# Patient Record
Sex: FEMALE | Race: BLACK OR AFRICAN AMERICAN | Employment: UNEMPLOYED | URBAN - METROPOLITAN AREA
[De-identification: names, ages, dates, MRNs, and addresses within clinical notes are randomized per-mention and may not be internally consistent; named-entity substitution may affect disease eponyms.]

---

## 2017-01-30 ENCOUNTER — OFFICE VISIT (OUTPATIENT)
Dept: BEHAVIORAL/MENTAL HEALTH CLINIC | Age: 62
End: 2017-01-30

## 2017-01-30 DIAGNOSIS — F33.2 MAJOR DEPRESSIVE DISORDER, RECURRENT, SEVERE WITHOUT PSYCHOTIC FEATURES (HCC): Primary | ICD-10-CM

## 2017-01-30 DIAGNOSIS — F41.9 ANXIETY DISORDER, UNSPECIFIED TYPE: ICD-10-CM

## 2017-01-30 RX ORDER — TRAZODONE HYDROCHLORIDE 100 MG/1
TABLET ORAL
Qty: 30 TAB | Refills: 3 | Status: SHIPPED | OUTPATIENT
Start: 2017-01-30 | End: 2017-05-01 | Stop reason: SDUPTHER

## 2017-01-30 RX ORDER — BUPROPION HYDROCHLORIDE 150 MG/1
TABLET, EXTENDED RELEASE ORAL
Qty: 60 TAB | Refills: 3 | Status: SHIPPED | OUTPATIENT
Start: 2017-01-30 | End: 2017-05-01 | Stop reason: SDUPTHER

## 2017-01-30 RX ORDER — BUSPIRONE HYDROCHLORIDE 15 MG/1
15 TABLET ORAL 3 TIMES DAILY
Qty: 90 TAB | Refills: 3 | Status: SHIPPED | OUTPATIENT
Start: 2017-01-30 | End: 2017-03-01

## 2017-01-30 RX ORDER — FLUOXETINE HYDROCHLORIDE 40 MG/1
CAPSULE ORAL
Qty: 60 CAP | Refills: 3 | Status: SHIPPED | OUTPATIENT
Start: 2017-01-30 | End: 2017-05-01 | Stop reason: SDUPTHER

## 2017-01-30 NOTE — PROGRESS NOTES
Psychiatric Progress Note    Date: 1/30/2017  Account Number:  [id number]  Name: Myron Villalobos    Length of psychotherapy session: 15 minutes     Total Patient Care Time Spent: 20 minutes : (Coordinated care:  counseling time with patient, individual psychotherapy with patient; discussions with family members and chart review). SUBJECTIVE:   Myron Villalobos  is a 63 y.o.  female  patient presents for a therapy/psychopharmacological management appointment. Pt was in wheel chair accompanied by her son. 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 Pt states that she underwent surgery to remove her hip, now she is with \"no hip\". No word on yet when she will get a new hip. Provided support to pt. Pt reports overall doing well psychiatrically but does report lingering anxiety. Recommended to adjust her Buspar dose to help with her anxiety. Pt agreed. Patient denies SI/HI/SIB. No evidence of AH/VH or delusions.       Appetite:no change from normal   Sleep: good    Response to Treatment: Positive   Side Effects: none      Supportive/Cognitive/Reality-Oriented psychotherapy provided in regards to psychosocial stressors:   pre-admission and current problems   Housing issues   Occupational issues   Academic issues   Legal issues   Medical issues   Interpersonal conflicts   Stress of hospitalization  Psychoeducation provided  Treatment plan reviewed with patient-including diagnosis and medications  Worked on issues of denial & effects of substance dependency/use      OBJECTIVE:                 Mental Status exam: WNL except for      Sensorium  oriented to time, place and person   Relations cooperative    Eye Contact    appropriate   Appearance:  age appropriate, casually dressed and overweight, in wheel chair    Motor Behavior:  within normal limits/in wheel chair    Speech:  normal pitch and normal volume   Thought Process: within normal limits Thought Content free of delusions and free of hallucinations   Suicidal ideations none   Homicidal ideations none   Mood:  Anxious    Affect:  mood congruent    Memory recent  adequate   Memory remote:  adequate   Concentration:  adequate   Abstraction:  abstract   Insight:  fair   Reliability fair   Judgment:  fair       Allergies   Allergen Reactions    Penicillin G Benzathin,Procain Hives        Current Outpatient Prescriptions   Medication Sig Dispense Refill    traZODone (DESYREL) 100 mg tablet TAKE 1 TABLET BY MOUTH AT BEDTIME FOR INSOMNIA  Indications: insomnia associated with depression, insomnia 30 Tab 3    FLUoxetine (PROZAC) 40 mg capsule TAKE 2 CAPSULA BY MOUTH DAILY  Indications: ANXIETY WITH DEPRESSION, major depressive disorder 60 Cap 3    buPROPion SR (WELLBUTRIN SR) 150 mg SR tablet TAKE 1 TABLET BY MOUTH EVERY MORNING AND TAKE SECOND DOSE AT 2PM  Indications: major depressive disorder 60 Tab 3    busPIRone (BUSPAR) 15 mg tablet Take 1 Tab by mouth three (3) times daily for 30 days. Indications: GENERALIZED ANXIETY DISORDER 90 Tab 3    oxyCODONE IR (ROXICODONE) 5 mg immediate release tablet Take 1 Tab by mouth every six (6) hours as needed. Max Daily Amount: 20 mg. 20 Tab 0    gabapentin (NEURONTIN) 300 mg capsule Take 300 mg by mouth three (3) times daily.  meloxicam (MOBIC) 15 mg tablet Take 15 mg by mouth daily. Indications: OSTEOARTHRITIS      topiramate (TOPAMAX) 100 mg tablet Take 1 Tab by mouth nightly. 30 Tab 3    omeprazole (PRILOSEC) 20 mg capsule Take 20 mg by mouth daily.  MULTIVITAMINS WITH FLUORIDE (MULTI-VITAMIN PO) Take 1 Tab by mouth daily.  CALCIUM PO Take 500 mg by mouth daily.  CYANOCOBALAMIN, VITAMIN B-12, (VITAMIN B-12 PO) Take  by mouth daily.  budesonide-formoterol (SYMBICORT) 160-4.5 mcg/actuation HFA inhaler Take 2 Puffs by inhalation two (2) times a day.  metoprolol succinate (TOPROL-XL) 50 mg XL tablet Take 1 Tab by mouth daily. (Patient taking differently: Take 50 mg by mouth nightly.) 30 Tab 5    ipratropium-albuterol (COMBIVENT RESPIMAT)  mcg/actuation inhaler Take 1 puff by inhalation as needed for Wheezing.  montelukast (SINGULAIR) 10 mg tablet Take 10 mg by mouth nightly. Medication Changes/Adjustments:   Medications Discontinued During This Encounter   Medication Reason    busPIRone (BUSPAR) 10 mg tablet Dose Adjustment    traZODone (DESYREL) 100 mg tablet Reorder    FLUoxetine (PROZAC) 40 mg capsule Reorder    buPROPion SR (WELLBUTRIN SR) 150 mg SR tablet Reorder          ASSESSMENT:  Eunice Healy  is a 63 y.o.  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 Currently reports feeling better, but is without \"hip\". She has been doing well on her psych meds, her mood has been stable, she does report lingering anxiety. Diagnoses:   Axis I: Major Depressive d/o, recurrent, moderate   Anxiety d/o   Axis II: Deferred   Axis III: Asthma, Sleep Apnea   Axis IV: Moderate  Axis V:  55-65    RECOMMENDATIONS/PLAN: continue with current meds  1. Medications: Medications reviewed, continue with the current meds. Orders Placed This Encounter    traZODone (DESYREL) 100 mg tablet    FLUoxetine (PROZAC) 40 mg capsule    buPROPion SR (WELLBUTRIN SR) 150 mg SR tablet    busPIRone (BUSPAR) 15 mg tablet      2. Follow-up Disposition:  Return in about 3 months (around 4/30/2017).

## 2017-01-30 NOTE — MR AVS SNAPSHOT
Visit Information Date & Time Provider Department Dept. Phone Encounter #  
 1/30/2017  9:30 AM MD Lily Soto [address] [phone] [id number] Follow-up Instructions Return in about 3 months (around 4/30/2017). Your Appointments 5/1/2017 11:15 AM  
ESTABLISHED PATIENT with MD Lily Soto [facility]) Appt Note:   
 [address] [facility] [address] [place] [address]  
[phone] [address] [place] [address] Upcoming Health Maintenance Date Due Hepatitis C Screening 1955 Pneumococcal 19-64 Medium Risk (1 of 1 - PPSV23) 7/31/1976 DTaP/Tdap/Td series (1 - Tdap) 7/31/1978 PAP AKA CERVICAL CYTOLOGY 7/31/1978 BREAST CANCER SCRN MAMMOGRAM 7/31/2007 FOBT Q 1 YEAR AGE 50-75 7/31/2007 INFLUENZA AGE 9 TO ADULT 8/1/2016 Allergies as of 1/30/2017  Review Complete On: 1/30/2017 By: Thomas Lakhani MD  
  
 Severity Noted Reaction Type Reactions Penicillin G Benzathin,procain  01/24/2013    Hives Current Immunizations  Never Reviewed No immunizations on file. Not reviewed this visit You Were Diagnosed With   
  
 Codes Comments Major depressive disorder, recurrent, severe without psychotic features ([facility] [address]    -  Primary ICD-10-CM: F33.2 ICD-9-CM: 296.33 Anxiety disorder, unspecified type     ICD-10-CM: F41.9 ICD-9-CM: 300.00 Vitals OB Status Smoking Status Hysterectomy Never Smoker Preferred Pharmacy Pharmacy Name Phone [facility] [address] [facility]  [facility] [phone] Your Updated Medication List  
  
   
This list is accurate as of: 1/30/17 10:06 AM.  Always use your most recent med list.  
  
  
  
  
 budesonide-formoterol 160-4.5 mcg/actuation HFA inhaler Commonly known as:  SYMBICORT  
 Take 2 Puffs by inhalation two (2) times a day. buPROPion  mg SR tablet Commonly known as:  WELLBUTRIN SR  
TAKE 1 TABLET BY MOUTH EVERY MORNING AND TAKE SECOND DOSE AT 2PM  Indications: major depressive disorder  
  
 busPIRone 15 mg tablet Commonly known as:  BUSPAR Take 1 Tab by mouth three (3) times daily for 30 days. Indications: GENERALIZED ANXIETY DISORDER  
  
 CALCIUM PO Take 500 mg by mouth daily. COMBIVENT RESPIMAT  mcg/actuation inhaler Generic drug:  ipratropium-albuterol Take 1 puff by inhalation as needed for Wheezing. FLUoxetine 40 mg capsule Commonly known as:  PROzac TAKE 2 CAPSULA BY MOUTH DAILY  Indications: ANXIETY WITH DEPRESSION, major depressive disorder  
  
 gabapentin 300 mg capsule Commonly known as:  NEURONTIN Take 300 mg by mouth three (3) times daily. meloxicam 15 mg tablet Commonly known as:  MOBIC Take 15 mg by mouth daily. Indications: OSTEOARTHRITIS  
  
 metoprolol succinate 50 mg XL tablet Commonly known as:  TOPROL-XL Take 1 Tab by mouth daily. montelukast 10 mg tablet Commonly known as:  SINGULAIR Take 10 mg by mouth nightly. MULTI-VITAMIN PO Take 1 Tab by mouth daily. omeprazole 20 mg capsule Commonly known as:  PRILOSEC Take 20 mg by mouth daily. oxyCODONE IR 5 mg immediate release tablet Commonly known as:  Charolet Music Take 1 Tab by mouth every six (6) hours as needed. Max Daily Amount: 20 mg.  
  
 topiramate 100 mg tablet Commonly known as:  TOPAMAX Take 1 Tab by mouth nightly. traZODone 100 mg tablet Commonly known as:  DESYREL  
TAKE 1 TABLET BY MOUTH AT BEDTIME FOR INSOMNIA  Indications: insomnia associated with depression, insomnia VITAMIN B-12 PO Take  by mouth daily. Prescriptions Printed Refills  
 traZODone (DESYREL) 100 mg tablet 3  Sig: TAKE 1 TABLET BY MOUTH AT BEDTIME FOR INSOMNIA  Indications: insomnia associated with depression, insomnia Class: Print FLUoxetine (PROZAC) 40 mg capsule 3 Sig: TAKE 2 CAPSULA BY MOUTH DAILY  Indications: ANXIETY WITH DEPRESSION, major depressive disorder Class: Print buPROPion SR (WELLBUTRIN SR) 150 mg SR tablet 3 Sig: TAKE 1 TABLET BY MOUTH EVERY MORNING AND TAKE SECOND DOSE AT 2PM  Indications: major depressive disorder Class: Print  
 busPIRone (BUSPAR) 15 mg tablet 3 Sig: Take 1 Tab by mouth three (3) times daily for 30 days. Indications: GENERALIZED ANXIETY DISORDER Class: Print Route: Oral  
  
Follow-up Instructions Return in about 3 months (around 4/30/2017). Introducing [facility]! Dear Mary Andrew: 
Thank you for requesting a [employer] account. Our records indicate that you already have an active [employer] account. You can access your account anytime at [email or web address] Did you know that you can access your hospital and ER discharge instructions at any time in [employer]? You can also review all of your test results from your hospital stay or ER visit. Additional Information If you have questions, please visit the Frequently Asked Questions section of the [employer] website at [email or web address] Remember, [employer] is NOT to be used for urgent needs. For medical emergencies, dial 911. Now available from your iPhone and Android! Please provide this summary of care documentation to your next provider. Your primary care clinician is listed as Heri Jackson. If you have any questions after today's visit, please call [phone].

## 2017-03-02 RX ORDER — TOPIRAMATE 100 MG/1
100 TABLET, FILM COATED ORAL
Qty: 30 TAB | Refills: 0 | Status: SHIPPED | OUTPATIENT
Start: 2017-03-02 | End: 2017-09-20

## 2017-04-14 ENCOUNTER — OFFICE VISIT (OUTPATIENT)
Dept: CARDIOLOGY CLINIC | Age: 62
End: 2017-04-14

## 2017-04-14 VITALS
WEIGHT: 178 LBS | OXYGEN SATURATION: 95 % | HEIGHT: 62 IN | RESPIRATION RATE: 19 BRPM | BODY MASS INDEX: 32.76 KG/M2 | HEART RATE: 68 BPM | DIASTOLIC BLOOD PRESSURE: 93 MMHG | SYSTOLIC BLOOD PRESSURE: 138 MMHG

## 2017-04-14 DIAGNOSIS — R06.02 SHORTNESS OF BREATH: Primary | ICD-10-CM

## 2017-04-14 DIAGNOSIS — R94.39 ABNORMAL STRESS TEST: ICD-10-CM

## 2017-04-14 NOTE — PROGRESS NOTES
LAST OFFICE VISIT : 8/20/2015      No diagnosis found. Jean Carlos Rodríguez is a 63 y.o. female with hypertension and hyperlipidemia referred for cardiac clearance. Cardiac risk factors: dyslipidemia, obesity, hypertension, post-menopausal.  I have personally obtained the history from the patient. HISTORY OF PRESENTING ILLNESS      She is doing well from a cardiac standpoint currently. She is having hip surgery on 4/17 and requires cardiac clearance for this. The patient denies chest pain/ shortness of breath, orthopnea, PND, LE edema, palpitations, syncope, presyncope or fatigue.        ACTIVE PROBLEM LIST     Patient Active Problem List    Diagnosis Date Noted    Abscess of right hip 11/08/2016    COPD (chronic obstructive pulmonary disease) ([facility] [address] 01/19/2016    HAWK (obstructive sleep apnea) 01/19/2016    Morbid obesity ([address] 05/19/2015    Insomnia 03/11/2015    Essential tremor 09/30/2014    Anxiety disorder 09/09/2013    Major depressive disorder, recurrent episode, severe ([address] 09/09/2013           PAST MEDICAL HISTORY     Past Medical History:   Diagnosis Date    Anxiety     Arthritis     osteoarthritis    Asthma     Chronic pain     bilateral knees, lower back, neck    COPD (chronic obstructive pulmonary disease) ([address]     Depression     Diabetes (HCC)     diet controlled    Fatigue     FH: mental illness     GERD (gastroesophageal reflux disease)     Hypertension     Ill-defined condition     essential tremor    Morbid obesity (HCC)     Muscle pain     Ringing in ears     Shingles 2/2016    Sleep apnea     Sleep apnea     no CPAP  2 liters 02 via nc hs    Snoring     SOB (shortness of breath)            PAST SURGICAL HISTORY     Past Surgical History:   Procedure Laterality Date    HX GASTRECTOMY  7/13/16    sleeve by Dr. Tiff Logan  7/13/16    Hiatal by     HX HYSTERECTOMY  1990    HX ORTHOPAEDIC  2009    R total hip repleacement    HX ORTHOPAEDIC  5/2015    repair of left knee          ALLERGIES     Allergies   Allergen Reactions    Penicillin G Benzathin,Procain Hives          FAMILY HISTORY     Family History   Problem Relation Age of Onset    HIV/AIDS Mother     Asthma Father     Asthma Brother     Diabetes Brother     Asthma Brother     Cancer Paternal Grandmother     Anesth Problems Neg Hx     negative for cardiac disease       SOCIAL HISTORY     Social History     Social History    Marital status:      Spouse name: N/A    Number of children: 3    Years of education: N/A     Occupational History    unemployed       Social History Main Topics    Smoking status: Never Smoker    Smokeless tobacco: Never Used    Alcohol use No    Drug use: No    Sexual activity: Not Asked     Other Topics Concern    None     Social History Narrative    In the home with spouse and adult son (both independent)         MEDICATIONS     Current Outpatient Prescriptions   Medication Sig    topiramate (TOPAMAX) 100 mg tablet Take 1 Tab by mouth nightly.  traZODone (DESYREL) 100 mg tablet TAKE 1 TABLET BY MOUTH AT BEDTIME FOR INSOMNIA  Indications: insomnia associated with depression, insomnia    FLUoxetine (PROZAC) 40 mg capsule TAKE 2 CAPSULA BY MOUTH DAILY  Indications: ANXIETY WITH DEPRESSION, major depressive disorder    buPROPion SR (WELLBUTRIN SR) 150 mg SR tablet TAKE 1 TABLET BY MOUTH EVERY MORNING AND TAKE SECOND DOSE AT 2PM  Indications: major depressive disorder    gabapentin (NEURONTIN) 300 mg capsule Take 300 mg by mouth three (3) times daily.  omeprazole (PRILOSEC) 20 mg capsule Take 20 mg by mouth daily.  MULTIVITAMINS WITH FLUORIDE (MULTI-VITAMIN PO) Take 1 Tab by mouth daily.  CALCIUM PO Take 500 mg by mouth daily.  CYANOCOBALAMIN, VITAMIN B-12, (VITAMIN B-12 PO) Take  by mouth daily.  budesonide-formoterol (SYMBICORT) 160-4.5 mcg/actuation HFA inhaler Take 2 Puffs by inhalation two (2) times a day.     metoprolol succinate (TOPROL-XL) 50 mg XL tablet Take 1 Tab by mouth daily. (Patient taking differently: Take 50 mg by mouth nightly.)    ipratropium-albuterol (COMBIVENT RESPIMAT)  mcg/actuation inhaler Take 1 puff by inhalation as needed for Wheezing.  montelukast (SINGULAIR) 10 mg tablet Take 10 mg by mouth nightly.  oxyCODONE IR (ROXICODONE) 5 mg immediate release tablet Take 1 Tab by mouth every six (6) hours as needed. Max Daily Amount: 20 mg.    meloxicam (MOBIC) 15 mg tablet Take 15 mg by mouth daily. Indications: OSTEOARTHRITIS     No current facility-administered medications for this visit. I have reviewed the nurses notes, vitals, problem list, allergy list, medical history, family, social history and medications. REVIEW OF SYMPTOMS      General: Pt denies excessive weight gain or loss. Pt is able to conduct ADL's  HEENT: Denies blurred vision, headaches, hearing loss, epistaxis and difficulty swallowing. Respiratory: Denies cough, congestion, shortness of breath, GRANADOS, wheezing or stridor. Cardiovascular: Denies precordial pain, palpitations, edema or PND  Gastrointestinal: Denies poor appetite, indigestion, abdominal pain or blood in stool  Genitourinary: Denies hematuria, dysuria, increased urinary frequency  Musculoskeletal: Denies joint pain or swelling from muscles or joints  Neurologic: Denies tremor, paresthesias, headache, or sensory motor disturbance  Psychiatric: Denies confusion, insomnia, depression  Integumentray: Denies rash, itching or ulcers. Hematologic: Denies easy bruising, bleeding     PHYSICAL EXAMINATION      Vitals:    04/14/17 1056   BP: (!) 138/93   Pulse: 68   Resp: 19   SpO2: 95%   Weight: 178 lb (80.7 kg)   Height: 5' 2\" (1.575 m)     General: Well developed, in no acute distress. In wheelchair  HEENT: No jaundice, oral mucosa moist, no oral ulcers  Neck: Supple, no stiffness, no lymphadenopathy, supple  Heart:  Normal S1/S2 negative S3 or S4. Regular, no murmur, gallop or rub, no jugular venous distention  Respiratory: Clear bilaterally x 4, no wheezing or rales    Extremities:  No edema, normal cap refill, no cyanosis. Musculoskeletal: No clubbing, no deformities   Neuro: A&Ox3, speech clear, gait stable, cooperative, no focal neurologic deficits  Skin: Skin color is normal. No rashes or lesions. Non diaphoretic, moist.  Vascular: 2+ pulses symmetric in all extremities        EKG: Pending     DIAGNOSTIC DATA     1. Cardiac Cath  9/23/15-   1) LM long,large no dz  2) LAD moderate caliber course to the apex. There is some myocardial  bridging in the mid segment of the LAD. No disease. Diagonal without disease. DI high take off and no disease  3) Circumflex -mod -> small no disease  4) RCA is large dominant with no disease and the PDA and PL are free of  Disease    2. Echo  7/1/15- EF 65%    3. Lexiscan  8/21/15- mod size, mod-severe grade, predominantly reversible defect involving the apex and distal inferior wall, mod extent of ischemia       LABORATORY DATA            Lab Results   Component Value Date/Time    WBC 5.3 11/09/2016 02:32 AM    HGB 11.9 11/09/2016 02:32 AM    HCT 36.6 11/09/2016 02:32 AM    PLATELET 377 85/29/6608 02:32 AM    MCV 97.3 11/09/2016 02:32 AM      Lab Results   Component Value Date/Time    Sodium 144 11/11/2016 01:13 AM    Potassium 3.6 11/11/2016 01:13 AM    Chloride 113 11/11/2016 01:13 AM    CO2 24 11/11/2016 01:13 AM    Anion gap 7 11/11/2016 01:13 AM    Glucose 103 11/11/2016 01:13 AM    BUN 17 11/11/2016 01:13 AM    Creatinine 0.73 11/11/2016 01:13 AM    BUN/Creatinine ratio 23 11/11/2016 01:13 AM    GFR est AA >60 11/11/2016 01:13 AM    GFR est non-AA >60 11/11/2016 01:13 AM    Calcium 8.2 11/11/2016 01:13 AM    Bilirubin, total 0.3 11/08/2016 07:28 PM    AST (SGOT) 18 11/08/2016 07:28 PM    Alk.  phosphatase 96 11/08/2016 07:28 PM    Protein, total 7.0 11/08/2016 07:28 PM    Albumin 3.0 11/08/2016 07:28 PM    Globulin 4.0 11/08/2016 07:28 PM    A-G Ratio 0.8 11/08/2016 07:28 PM    ALT (SGPT) 20 11/08/2016 07:28 PM           ASSESSMENT/RECOMMENDATIONS:.      1. Cardiac preoperative risk assessment  -I believe she is low operative risk based on normal cardiac catheterization in 2015   -no chest pain or SOB  -she may proceed with surgery  -if any further assistance is needed do not hesitate to call   -awaiting ECG from her primary cares office.     2. Hypertension  -BP today was 138/80  -did have some coffee this morning and it may be related to that   -if BP does not come down will increase metoprolol to 75 mg daily  -make sure metoprolol is not held at any time during surgery      3. dyslipidemia  -lipids have been at goal and are followed by her PCP     4. Return in 6 months or PRN    No orders of the defined types were placed in this encounter. Follow-up Disposition: Not on File      I have discussed the diagnosis with  Geofm Males and the intended plan as seen in the above orders. Questions were answered concerning future plans. I have discussed medication side effects and warnings with the patient as well. Thank you,  Jolene Stevenson MD for involving me in the care of  Geofm Males. Please do not hesitate to contact me for further questions/concerns. This note was written by estefany Esparza, as dictated by Miguel Valverde MD.      Charli Zimmer. MD [facility], [address] [place] [address] Rd[address]      [facility], [address] [place] [address] [address]      [phone] / [phone] Fax

## 2017-04-14 NOTE — PROGRESS NOTES
Visit Vitals    BP (!) 138/93 (BP 1 Location: Left arm, BP Patient Position: Sitting)    Pulse 68    Resp 19    Ht 5' 2\" (1.575 m)    Wt 178 lb (80.7 kg)    SpO2 95%    BMI 32.56 kg/m2     Pt has no question or concerns at this time   Here or clearance for surgery

## 2017-05-01 ENCOUNTER — OFFICE VISIT (OUTPATIENT)
Dept: BEHAVIORAL/MENTAL HEALTH CLINIC | Age: 62
End: 2017-05-01

## 2017-05-01 VITALS — DIASTOLIC BLOOD PRESSURE: 87 MMHG | SYSTOLIC BLOOD PRESSURE: 132 MMHG | HEART RATE: 82 BPM | HEIGHT: 62 IN

## 2017-05-01 DIAGNOSIS — F33.1 MAJOR DEPRESSIVE DISORDER, RECURRENT, MODERATE (HCC): ICD-10-CM

## 2017-05-01 DIAGNOSIS — F41.9 ANXIETY DISORDER, UNSPECIFIED TYPE: Primary | ICD-10-CM

## 2017-05-01 RX ORDER — BUSPIRONE HYDROCHLORIDE 15 MG/1
15 TABLET ORAL 3 TIMES DAILY
Qty: 90 TAB | Refills: 3 | Status: SHIPPED | OUTPATIENT
Start: 2017-05-01 | End: 2017-08-01 | Stop reason: DRUGHIGH

## 2017-05-01 RX ORDER — TRAZODONE HYDROCHLORIDE 100 MG/1
TABLET ORAL
Qty: 30 TAB | Refills: 3 | Status: SHIPPED | OUTPATIENT
Start: 2017-05-01 | End: 2017-08-01 | Stop reason: SDUPTHER

## 2017-05-01 RX ORDER — BUPROPION HYDROCHLORIDE 150 MG/1
TABLET, EXTENDED RELEASE ORAL
Qty: 30 TAB | Refills: 3 | Status: SHIPPED | OUTPATIENT
Start: 2017-05-01 | End: 2017-08-01 | Stop reason: DRUGHIGH

## 2017-05-01 RX ORDER — FLUOXETINE HYDROCHLORIDE 40 MG/1
CAPSULE ORAL
Qty: 30 CAP | Refills: 3 | Status: SHIPPED | OUTPATIENT
Start: 2017-05-01 | End: 2017-08-01 | Stop reason: DRUGHIGH

## 2017-05-01 RX ORDER — BUSPIRONE HYDROCHLORIDE 15 MG/1
TABLET ORAL
COMMUNITY
Start: 2017-04-28 | End: 2017-05-01 | Stop reason: SDUPTHER

## 2017-05-01 NOTE — MR AVS SNAPSHOT
Visit Information Date & Time Provider Department Dept. Phone Encounter #  
 5/1/2017 11:15 AM Leander Leventhal, MD [phone] [id number] Follow-up Instructions Return in about 3 months (around 8/1/2017). Upcoming Health Maintenance Date Due Hepatitis C Screening 1955 Pneumococcal 19-64 Medium Risk (1 of 1 - PPSV23) 7/31/1976 DTaP/Tdap/Td series (1 - Tdap) 7/31/1978 PAP AKA CERVICAL CYTOLOGY 7/31/1978 BREAST CANCER SCRN MAMMOGRAM 7/31/2007 FOBT Q 1 YEAR AGE 50-75 7/31/2007 INFLUENZA AGE 9 TO ADULT 8/1/2017 Allergies as of 5/1/2017  Review Complete On: 5/1/2017 By: Leander Leventhal, MD  
  
 Severity Noted Reaction Type Reactions Penicillin G Benzathin,procain  01/24/2013    Hives Current Immunizations  Never Reviewed No immunizations on file. Not reviewed this visit You Were Diagnosed With   
  
 Codes Comments Anxiety disorder, unspecified type    -  Primary ICD-10-CM: F41.9 ICD-9-CM: 300.00 Major depressive disorder, recurrent, moderate (HCC)     ICD-10-CM: F33.1 ICD-9-CM: 296.32 Vitals BP Pulse Height(growth percentile) OB Status Smoking Status 132/87 82 5' 2\" (1.575 m) Hysterectomy Never Smoker Preferred Pharmacy Pharmacy Name Phone [address] [address], [address] AT [facility] OF  [facility] [phone] Your Updated Medication List  
  
   
This list is accurate as of: 5/1/17 11:49 AM.  Always use your most recent med list.  
  
  
  
  
 budesonide-formoterol 160-4.5 mcg/actuation HFA inhaler Commonly known as:  SYMBICORT Take 2 Puffs by inhalation two (2) times a day. buPROPion  mg SR tablet Commonly known as:  WELLBUTRIN SR  
TAKE 1 TABLET BY MOUTH EVERY MORNING  Indications: ANXIETY WITH DEPRESSION, major depressive disorder  
  
 busPIRone 15 mg tablet Commonly known as:  BUSPAR  
 Take 1 Tab by mouth three (3) times daily. Indications: GENERALIZED ANXIETY DISORDER  
  
 CALCIUM PO Take 500 mg by mouth daily. COMBIVENT RESPIMAT  mcg/actuation inhaler Generic drug:  ipratropium-albuterol Take 1 puff by inhalation as needed for Wheezing. FLUoxetine 40 mg capsule Commonly known as:  PROzac TAKE 1 CAPSULE BY MOUTH DAILY  Indications: ANXIETY WITH DEPRESSION, major depressive disorder  
  
 gabapentin 300 mg capsule Commonly known as:  NEURONTIN Take 300 mg by mouth three (3) times daily. meloxicam 15 mg tablet Commonly known as:  MOBIC Take 15 mg by mouth daily. Indications: OSTEOARTHRITIS  
  
 metoprolol succinate 50 mg XL tablet Commonly known as:  TOPROL-XL Take 1 Tab by mouth daily. montelukast 10 mg tablet Commonly known as:  SINGULAIR Take 10 mg by mouth nightly. MULTI-VITAMIN PO Take 1 Tab by mouth daily. omeprazole 20 mg capsule Commonly known as:  PRILOSEC Take 20 mg by mouth daily. oxyCODONE IR 5 mg immediate release tablet Commonly known as:  Thressa Iftikhar Take 1 Tab by mouth every six (6) hours as needed. Max Daily Amount: 20 mg.  
  
 topiramate 100 mg tablet Commonly known as:  TOPAMAX Take 1 Tab by mouth nightly. traZODone 100 mg tablet Commonly known as:  DESYREL  
TAKE 1 TABLET BY MOUTH AT BEDTIME FOR INSOMNIA  Indications: insomnia associated with depression, insomnia VITAMIN B-12 PO Take  by mouth daily. Prescriptions Printed Refills buPROPion SR (WELLBUTRIN SR) 150 mg SR tablet 3 Sig: TAKE 1 TABLET BY MOUTH EVERY MORNING  Indications: ANXIETY WITH DEPRESSION, major depressive disorder Class: Print  
 busPIRone (BUSPAR) 15 mg tablet 3 Sig: Take 1 Tab by mouth three (3) times daily. Indications: GENERALIZED ANXIETY DISORDER Class: Print  Route: Oral  
 traZODone (DESYREL) 100 mg tablet 3  
 Sig: TAKE 1 TABLET BY MOUTH AT BEDTIME FOR INSOMNIA  Indications: insomnia associated with depression, insomnia Class: Print FLUoxetine (PROZAC) 40 mg capsule 3 Sig: TAKE 1 CAPSULE BY MOUTH DAILY  Indications: ANXIETY WITH DEPRESSION, major depressive disorder Class: Print Follow-up Instructions Return in about 3 months (around 8/1/2017). Introducing hospitals & [facility] SERVICES! Dear Zachary Carlton: 
Thank you for requesting a [employer] account. Our records indicate that you already have an active [employer] account. You can access your account anytime at [email or web address] Did you know that you can access your hospital and ER discharge instructions at any time in [employer]? You can also review all of your test results from your hospital stay or ER visit. Additional Information If you have questions, please visit the Frequently Asked Questions section of the [employer] website at [email or web address] Remember, [employer] is NOT to be used for urgent needs. For medical emergencies, dial 911. Now available from your iPhone and Android! Please provide this summary of care documentation to your next provider. Your primary care clinician is listed as Mackenzie Barragan. If you have any questions after today's visit, please call [phone].

## 2017-05-03 NOTE — PROGRESS NOTES
Psychiatric Progress Note    Date: 5/1/2017  Account Number:  [id number]  Name: aSndra Gallardo    Length of psychotherapy session: 15 minutes     Total Patient Care Time Spent: 20 minutes : (Coordinated care:  counseling time with patient, individual psychotherapy with patient; discussions with family members and chart review). SUBJECTIVE:   Sandra Gallardo  is a 63 y.o.  female  patient presents for a therapy/psychopharmacological management appointment. Pt was in wheel chair accompanied by her son. She recently underwent another hip surgery to replace the hip which was removed in her last surgery, pt is hopeful and expects full recovery. She does feel the need to cut back on her psych meds because she thinks its too much. Currently her mood is stable on the meds. Discussed the need to taper down her meds slowly to see if she continues to stay stable, if mood fluctuates then pt may need to go back on her previous doses, pt verbalized understanding. Patient denies SI/HI/SIB. No evidence of AH/VH or delusions.       Appetite:no change from normal   Sleep: good    Response to Treatment: Positive   Side Effects: none      Supportive/Cognitive/Reality-Oriented psychotherapy provided in regards to psychosocial stressors:   pre-admission and current problems   Housing issues   Occupational issues   Academic issues   Legal issues   Medical issues   Interpersonal conflicts   Stress of hospitalization  Psychoeducation provided  Treatment plan reviewed with patient-including diagnosis and medications  Worked on issues of denial & effects of substance dependency/use      OBJECTIVE:                 Mental Status exam: WNL except for      Sensorium  oriented to time, place and person   Relations cooperative    Eye Contact    appropriate   Appearance:  age appropriate, casually dressed and overweight, in wheel chair    Motor Behavior:  within normal limits/in wheel chair    Speech:  normal pitch and normal volume   Thought Process: within normal limits   Thought Content free of delusions and free of hallucinations   Suicidal ideations none   Homicidal ideations none   Mood:  stable   Affect:  mood congruent/constricted    Memory recent  adequate   Memory remote:  adequate   Concentration:  adequate   Abstraction:  abstract   Insight:  fair   Reliability fair   Judgment:  fair       Allergies   Allergen Reactions    Penicillin G Benzathin,Procain Hives        Current Outpatient Prescriptions   Medication Sig Dispense Refill    buPROPion SR (WELLBUTRIN SR) 150 mg SR tablet TAKE 1 TABLET BY MOUTH EVERY MORNING  Indications: ANXIETY WITH DEPRESSION, major depressive disorder 30 Tab 3    busPIRone (BUSPAR) 15 mg tablet Take 1 Tab by mouth three (3) times daily. Indications: GENERALIZED ANXIETY DISORDER 90 Tab 3    traZODone (DESYREL) 100 mg tablet TAKE 1 TABLET BY MOUTH AT BEDTIME FOR INSOMNIA  Indications: insomnia associated with depression, insomnia 30 Tab 3    FLUoxetine (PROZAC) 40 mg capsule TAKE 1 CAPSULE BY MOUTH DAILY  Indications: ANXIETY WITH DEPRESSION, major depressive disorder 30 Cap 3    topiramate (TOPAMAX) 100 mg tablet Take 1 Tab by mouth nightly. 30 Tab 0    oxyCODONE IR (ROXICODONE) 5 mg immediate release tablet Take 1 Tab by mouth every six (6) hours as needed. Max Daily Amount: 20 mg. 20 Tab 0    gabapentin (NEURONTIN) 300 mg capsule Take 300 mg by mouth three (3) times daily.  meloxicam (MOBIC) 15 mg tablet Take 15 mg by mouth daily. Indications: OSTEOARTHRITIS      omeprazole (PRILOSEC) 20 mg capsule Take 20 mg by mouth daily.  MULTIVITAMINS WITH FLUORIDE (MULTI-VITAMIN PO) Take 1 Tab by mouth daily.  CALCIUM PO Take 500 mg by mouth daily.  CYANOCOBALAMIN, VITAMIN B-12, (VITAMIN B-12 PO) Take  by mouth daily.  budesonide-formoterol (SYMBICORT) 160-4.5 mcg/actuation HFA inhaler Take 2 Puffs by inhalation two (2) times a day.       metoprolol succinate (TOPROL-XL) 50 mg XL tablet Take 1 Tab by mouth daily. (Patient taking differently: Take 50 mg by mouth nightly.) 30 Tab 5    ipratropium-albuterol (COMBIVENT RESPIMAT)  mcg/actuation inhaler Take 1 puff by inhalation as needed for Wheezing.  montelukast (SINGULAIR) 10 mg tablet Take 10 mg by mouth nightly. Medication Changes/Adjustments:   Medications Discontinued During This Encounter   Medication Reason    buPROPion SR (WELLBUTRIN SR) 150 mg SR tablet Reorder    busPIRone (BUSPAR) 15 mg tablet Reorder    traZODone (DESYREL) 100 mg tablet Reorder    FLUoxetine (PROZAC) 40 mg capsule Reorder          ASSESSMENT:  Viktor Lund  is a 63 y.o.  female patient presented for her f/u visit, accompanied by her son, in wheel chair, recently underwent another hip surgery. Wants to go down on her psych meds as feels that she does not need that much of medicine. Discussed the pros and cons and encouraged slow taper, will continue to monitor mood. Diagnoses:   Axis I: Major Depressive d/o, recurrent, moderate   Anxiety d/o   Axis II: Deferred   Axis III: Asthma, Sleep Apnea   Axis IV: Moderate  Axis V:  55-65    RECOMMENDATIONS/PLAN: continue with current meds  1. Medications: Medications reviewed, will reduce the dose of Prozac to 40mg daily, will reduce the dose of Wellbutrin SR to 150mg daily, continue rest of the meds as such. Orders Placed This Encounter    DISCONTD: busPIRone (BUSPAR) 15 mg tablet    buPROPion SR (WELLBUTRIN SR) 150 mg SR tablet    busPIRone (BUSPAR) 15 mg tablet    traZODone (DESYREL) 100 mg tablet    FLUoxetine (PROZAC) 40 mg capsule      2. Follow-up Disposition:  Return in about 3 months (around 8/1/2017).

## 2017-05-15 ENCOUNTER — TELEPHONE (OUTPATIENT)
Dept: SURGERY | Age: 62
End: 2017-05-15

## 2017-08-01 ENCOUNTER — OFFICE VISIT (OUTPATIENT)
Dept: BEHAVIORAL/MENTAL HEALTH CLINIC | Age: 62
End: 2017-08-01

## 2017-08-01 VITALS
SYSTOLIC BLOOD PRESSURE: 145 MMHG | DIASTOLIC BLOOD PRESSURE: 68 MMHG | BODY MASS INDEX: 33.68 KG/M2 | WEIGHT: 183 LBS | HEART RATE: 61 BPM | HEIGHT: 62 IN

## 2017-08-01 DIAGNOSIS — F33.1 MODERATE EPISODE OF RECURRENT MAJOR DEPRESSIVE DISORDER (HCC): Primary | ICD-10-CM

## 2017-08-01 DIAGNOSIS — G47.00 INSOMNIA, UNSPECIFIED TYPE: ICD-10-CM

## 2017-08-01 DIAGNOSIS — F41.9 ANXIETY DISORDER, UNSPECIFIED TYPE: ICD-10-CM

## 2017-08-01 RX ORDER — BUSPIRONE HYDROCHLORIDE 30 MG/1
30 TABLET ORAL 2 TIMES DAILY
Qty: 60 TAB | Refills: 1 | Status: SHIPPED | OUTPATIENT
Start: 2017-08-01 | End: 2017-11-30

## 2017-08-01 RX ORDER — TRAZODONE HYDROCHLORIDE 100 MG/1
TABLET ORAL
Qty: 30 TAB | Refills: 1 | Status: SHIPPED | OUTPATIENT
Start: 2017-08-01 | End: 2017-10-06 | Stop reason: SDUPTHER

## 2017-08-01 RX ORDER — NAPROXEN 500 MG/1
TABLET ORAL
COMMUNITY
Start: 2017-07-27 | End: 2021-12-09

## 2017-08-01 RX ORDER — BUPROPION HYDROCHLORIDE 75 MG/1
75 TABLET ORAL DAILY
Qty: 30 TAB | Refills: 1 | Status: SHIPPED | OUTPATIENT
Start: 2017-08-01 | End: 2017-11-30

## 2017-08-01 RX ORDER — FLUOXETINE HYDROCHLORIDE 20 MG/1
20 CAPSULE ORAL DAILY
Qty: 30 CAP | Refills: 1 | Status: SHIPPED | OUTPATIENT
Start: 2017-08-01 | End: 2017-11-30

## 2017-08-01 NOTE — PROGRESS NOTES
Psychiatric Progress Note    Date: 8/1/2017  Account Number:  [id number]  Name: Alley Nobles    Length of psychotherapy session: 15 minutes     Total Patient Care Time Spent: 20 minutes : (Coordinated care:  counseling time with patient, individual psychotherapy with patient; discussions with family members and chart review). SUBJECTIVE:   Alley Nobles  is a 63 y.o.  female  patient presents for a therapy/psychopharmacological management appointment. Pt was accompanied by her son. She was walking with a cane which is an improvement. Her rehab and recovery from second hip surgery is going well. She tolerated the reduction in her psych meds very well. Wants to continue to go down on Prozac and Wellbutrin. She does endorse anxiety and wants to up the dose of Buspar. Does anticipate knee surgery in near future. Patient denies SI/HI/SIB. No evidence of AH/VH or delusions.       Appetite:no change from normal   Sleep: good    Response to Treatment: Positive   Side Effects: none      Supportive/Cognitive/Reality-Oriented psychotherapy provided in regards to psychosocial stressors:   pre-admission and current problems   Housing issues   Occupational issues   Academic issues   Legal issues   Medical issues   Interpersonal conflicts   Stress of hospitalization  Psychoeducation provided  Treatment plan reviewed with patient-including diagnosis and medications  Worked on issues of denial & effects of substance dependency/use      OBJECTIVE:                 Mental Status exam: WNL except for      Sensorium  oriented to time, place and person   Relations cooperative    Eye Contact    appropriate   Appearance:  age appropriate, casually dressed and overweight, walking with cane   Motor Behavior:  within normal limits/walking with cane    Speech:  normal pitch and normal volume   Thought Process: within normal limits   Thought Content free of delusions and free of hallucinations   Suicidal ideations none   Homicidal ideations none   Mood:  stable   Affect:  mood congruent/constricted    Memory recent  adequate   Memory remote:  adequate   Concentration:  adequate   Abstraction:  abstract   Insight:  fair   Reliability fair   Judgment:  fair       Allergies   Allergen Reactions    Penicillin G Benzathin,Procain Hives        Current Outpatient Prescriptions   Medication Sig Dispense Refill    naproxen (NAPROSYN) 500 mg tablet       traZODone (DESYREL) 100 mg tablet TAKE 1 TABLET BY MOUTH AT BEDTIME FOR INSOMNIA  Indications: insomnia associated with depression, insomnia 30 Tab 1    busPIRone (BUSPAR) 30 mg tablet Take 1 Tab by mouth two (2) times a day. Indications: GENERALIZED ANXIETY DISORDER 60 Tab 1    FLUoxetine (PROZAC) 20 mg capsule Take 1 Cap by mouth daily. Indications: ANXIETY WITH DEPRESSION 30 Cap 1    buPROPion (WELLBUTRIN) 75 mg tablet Take 1 Tab by mouth daily. Indications: ANXIETY WITH DEPRESSION 30 Tab 1    MULTIVITAMINS WITH FLUORIDE (MULTI-VITAMIN PO) Take 1 Tab by mouth daily.  CALCIUM PO Take 500 mg by mouth daily.  CYANOCOBALAMIN, VITAMIN B-12, (VITAMIN B-12 PO) Take  by mouth daily.  budesonide-formoterol (SYMBICORT) 160-4.5 mcg/actuation HFA inhaler Take 2 Puffs by inhalation two (2) times a day.  metoprolol succinate (TOPROL-XL) 50 mg XL tablet Take 1 Tab by mouth daily. (Patient taking differently: Take 50 mg by mouth nightly.) 30 Tab 5    ipratropium-albuterol (COMBIVENT RESPIMAT)  mcg/actuation inhaler Take 1 puff by inhalation as needed for Wheezing.  montelukast (SINGULAIR) 10 mg tablet Take 10 mg by mouth nightly.  topiramate (TOPAMAX) 100 mg tablet Take 1 Tab by mouth nightly. 30 Tab 0    oxyCODONE IR (ROXICODONE) 5 mg immediate release tablet Take 1 Tab by mouth every six (6) hours as needed. Max Daily Amount: 20 mg. 20 Tab 0    gabapentin (NEURONTIN) 300 mg capsule Take 300 mg by mouth three (3) times daily.       meloxicam (MOBIC) 15 mg tablet Take 15 mg by mouth daily. Indications: OSTEOARTHRITIS      omeprazole (PRILOSEC) 20 mg capsule Take 20 mg by mouth daily. Medication Changes/Adjustments:   Medications Discontinued During This Encounter   Medication Reason    busPIRone (BUSPAR) 15 mg tablet Dose Adjustment    FLUoxetine (PROZAC) 40 mg capsule Dose Adjustment    buPROPion SR (WELLBUTRIN SR) 150 mg SR tablet Dose Adjustment    traZODone (DESYREL) 100 mg tablet Reorder          ASSESSMENT:  Cely Johnson  is a 63 y.o.  female patient presented for her f/u visit, accompanied by her son, pt is doing better with her recovery and rehab, now using cane for walking, which is an improvement. Mood is good, tolerated reduction in medication doses well, wants to continue to go down on Prozac and Wellbutrin, reports increased anxiety and is ready for titration up of Buspar. Diagnoses:   Axis I: Major Depressive d/o, recurrent, moderate   Anxiety d/o   Axis II: Deferred   Axis III: Asthma, Sleep Apnea   Axis IV: Moderate  Axis V:  60-69    RECOMMENDATIONS/PLAN: continue with current meds  1. Medications: Medications reviewed, will reduce the dose of Prozac to 20mg daily, will reduce the dose of Wellbutrin to 75mg daily, will increase Buspar to 30mg BID, continue Trazodone as such. Orders Placed This Encounter    naproxen (NAPROSYN) 500 mg tablet    traZODone (DESYREL) 100 mg tablet    busPIRone (BUSPAR) 30 mg tablet    FLUoxetine (PROZAC) 20 mg capsule    buPROPion (WELLBUTRIN) 75 mg tablet      2. Follow-up Disposition:  Return in about 2 months (around 10/1/2017).

## 2017-08-08 ENCOUNTER — OFFICE VISIT (OUTPATIENT)
Dept: SURGERY | Age: 62
End: 2017-08-08

## 2017-08-08 VITALS
RESPIRATION RATE: 20 BRPM | HEART RATE: 61 BPM | WEIGHT: 182 LBS | HEIGHT: 62 IN | OXYGEN SATURATION: 98 % | BODY MASS INDEX: 33.49 KG/M2 | TEMPERATURE: 99.2 F | SYSTOLIC BLOOD PRESSURE: 150 MMHG | DIASTOLIC BLOOD PRESSURE: 90 MMHG

## 2017-08-08 DIAGNOSIS — R63.5 WEIGHT GAIN, ABNORMAL: Primary | ICD-10-CM

## 2017-08-08 NOTE — PROGRESS NOTES
1. Have you been to the ER, urgent care clinic since your last visit? Hospitalized since your last visit? yes [facility] for rt hip surgery    2. Have you seen or consulted any other health care providers outside of the [address] [place] [address] since your last visit? Include any pap smears or colon screening.  Sarah MENDEZ

## 2017-08-09 NOTE — PATIENT INSTRUCTIONS
Abnormal Weight Gain: Care Instructions  Your Care Instructions  There are two types of weight gain--normal and abnormal. Normal weight gain is usually caused by eating too much or exercising too little. It can also happen as you get older. But abnormal weight gain has other causes. It can be caused by a problem with your thyroid gland, called hypothyroidism. Or it can be caused by a problem with your adrenal glands, called Cushing's syndrome. Or your body could be holding too much fluid because of kidney, liver, or heart problems. In some cases, a medicine you take can cause you to gain weight. You can work with your doctor to find out the cause of your weight gain. You will probably need tests to do this. Follow-up care is a key part of your treatment and safety. Be sure to make and go to all appointments, and call your doctor if you are having problems. It's also a good idea to know your test results and keep a list of the medicines you take. How can you care for yourself at home? · Weigh yourself at the same time every day. It's best to do it first thing in the morning after you empty your bladder. Be sure to always wear the same amount of clothing. · Write down any changes in your weight and the possible causes. Discuss these with your doctor. · Your doctor may want you to change your diet and exercise habits. A good way to lose weight is to reduce calories and increase exercise. · Walking is an easy way to get exercise. Try to walk a little longer every day. You also may want to swim, bike, or do other activities. · Ask your doctor if you should see a dietitian. This is a person who can help you plan meals that work best for your lifestyle. · If your doctor prescribed medicines, take them exactly as prescribed. Call your doctor if you think you are having a problem with your medicine. You will get more details on the specific medicines your doctor prescribes.   When should you call for help?  Watch closely for changes in your health, and be sure to contact your doctor if:  · You do not get better as expected. · You continue to gain weight. Where can you learn more? Go to http://guido-sussy.info/. Enter A175 in the search box to learn more about \"Abnormal Weight Gain: Care Instructions. \"  Current as of: October 13, 2016  Content Version: 11.3  © 5502-1140 [employer]. Care instructions adapted under license by [employer] (which disclaims liability or warranty for this information). If you have questions about a medical condition or this instruction, always ask your healthcare professional. [address] any warranty or liability for your use of this information.

## 2017-08-11 NOTE — PROGRESS NOTES
Subjective:      Mack Segura is a 63 y.o. female presents for postop care 1 year following LSG. Appetite is good. Eating a regular diet without difficulty. Bowel movements are regular. The patient is voiding without difficulty. 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 Objective:     Visit Vitals    /90 (BP 1 Location: Left arm, BP Patient Position: At rest)    Pulse 61    Temp 99.2 °F (37.3 °C)    Resp 20    Ht 5' 2\" (1.575 m)    Wt 182 lb (82.6 kg)    SpO2 98%    BMI 33.29 kg/m2       General:  alert, cooperative, no distress, appears stated age, mildly obese   Abdomen: deferred   Incision:   deferred     Assessment:     Doing well postoperatively. She has experienced weight gain related to being immobilized but is almost done with PT. Plan:     1. Continue current medications. 2. Bariatric diet.   3. Pt is to increase activities as tolerated as per PT.

## 2017-09-20 ENCOUNTER — OFFICE VISIT (OUTPATIENT)
Dept: CARDIOLOGY CLINIC | Age: 62
End: 2017-09-20

## 2017-09-20 VITALS
OXYGEN SATURATION: 98 % | WEIGHT: 198.4 LBS | RESPIRATION RATE: 16 BRPM | DIASTOLIC BLOOD PRESSURE: 80 MMHG | SYSTOLIC BLOOD PRESSURE: 140 MMHG | HEART RATE: 55 BPM | BODY MASS INDEX: 36.51 KG/M2 | HEIGHT: 62 IN

## 2017-09-20 DIAGNOSIS — E78.00 PURE HYPERCHOLESTEROLEMIA: ICD-10-CM

## 2017-09-20 DIAGNOSIS — I10 ESSENTIAL HYPERTENSION: ICD-10-CM

## 2017-09-20 DIAGNOSIS — R06.09 DOE (DYSPNEA ON EXERTION): ICD-10-CM

## 2017-09-20 DIAGNOSIS — Q24.5 CORONARY-MYOCARDIAL BRIDGE: Primary | ICD-10-CM

## 2017-09-20 DIAGNOSIS — J44.9 CHRONIC OBSTRUCTIVE PULMONARY DISEASE, UNSPECIFIED COPD TYPE (HCC): ICD-10-CM

## 2017-09-20 DIAGNOSIS — Z01.810 PRE-OPERATIVE CARDIOVASCULAR EXAMINATION: ICD-10-CM

## 2017-09-20 RX ORDER — BUDESONIDE AND FORMOTEROL FUMARATE DIHYDRATE 160; 4.5 UG/1; UG/1
2 AEROSOL RESPIRATORY (INHALATION)
COMMUNITY
End: 2017-09-20

## 2017-09-20 RX ORDER — METOPROLOL TARTRATE 50 MG/1
50 TABLET ORAL
COMMUNITY
End: 2017-11-30 | Stop reason: SDUPTHER

## 2017-09-20 RX ORDER — ALBUTEROL SULFATE 0.83 MG/ML
2.5 SOLUTION RESPIRATORY (INHALATION)
COMMUNITY
End: 2021-12-09

## 2017-09-20 NOTE — MR AVS SNAPSHOT
Visit Information Date & Time Provider Department Dept. Phone Encounter #  
 9/20/2017  9:40 AM Misael Peres MD [facility] [phone] [id number] Your Appointments 10/2/2017 11:45 AM  
ESTABLISHED PATIENT with MD Danita Salinas. Elizabeth 5 [address]) Appt Note: fu  
 [address] Isis Young 13  
[phone] [place] [address]  
  
    
 10/10/2017 10:40 AM  
ESTABLISHED PATIENT with MD Tahir Glynn [phone] ([address]) Appt Note: 2 month f/u $20CP  
 [address] [facility] [address] [facility] [address]  
[address] [address]  
  
    
 3/20/2018 11:40 AM  
ESTABLISHED PATIENT with Lupe Bartholomew MD  
[facility] ([facility]) Appt Note: 6 mo fup  
 320 [facility] [address] 600 1007 [facility]  
54 [facility] [address] [address] Upcoming Health Maintenance Date Due Hepatitis C Screening 1955 Pneumococcal 19-64 Medium Risk (1 of 1 - PPSV23) 7/31/1976 DTaP/Tdap/Td series (1 - Tdap) 7/31/1978 PAP AKA CERVICAL CYTOLOGY 7/31/1978 BREAST CANCER SCRN MAMMOGRAM 7/31/2007 FOBT Q 1 YEAR AGE 50-75 7/31/2007 ZOSTER VACCINE AGE 60> 5/31/2017 INFLUENZA AGE 9 TO ADULT 8/1/2017 Allergies as of 9/20/2017  Review Complete On: 9/20/2017 By: Kunal Camarena Severity Noted Reaction Type Reactions Penicillin G Benzathin,procain  01/24/2013    Hives Current Immunizations  Never Reviewed No immunizations on file. Not reviewed this visit You Were Diagnosed With   
  
 Codes Comments Chronic obstructive pulmonary disease, unspecified COPD type ([facility]    -  Primary ICD-10-CM: J44.9 ICD-9-CM: 987 Vitals BP Pulse Resp Height(growth percentile) Weight(growth percentile) SpO2  
 140/80 (BP 1 Location: Left arm, BP Patient Position: Sitting) (!) 55 16 5' 2\" (1.575 m) 198 lb 6.4 oz (90 kg) 98% BMI OB Status Smoking Status 36.29 kg/m2 Hysterectomy Never Smoker Vitals History BMI and BSA Data Body Mass Index Body Surface Area  
 36.29 kg/m 2 1.98 m 2 Preferred Pharmacy Pharmacy Name Phone Abby Garcia 169, Kiran Porter 8888 AT [facility] OF  [place]rent darrick [phone] Your Updated Medication List  
  
   
This list is accurate as of: 9/20/17 10:31 AM.  Always use your most recent med list.  
  
  
  
  
 albuterol 2.5 mg /3 mL (0.083 %) nebulizer solution Commonly known as:  PROVENTIL VENTOLIN Take 2.5 mg by inhalation. buPROPion 75 mg tablet Commonly known as:  [facility] Take 1 Tab by mouth daily. Indications: ANXIETY WITH DEPRESSION  
  
 busPIRone 30 mg tablet Commonly known as:  BUSPAR Take 1 Tab by mouth two (2) times a day. Indications: GENERALIZED ANXIETY DISORDER  
  
 CALCIUM PO Take 500 mg by mouth daily. COMBIVENT RESPIMAT  mcg/actuation inhaler Generic drug:  ipratropium-albuterol Take 1 puff by inhalation as needed for Wheezing. FLUoxetine 20 mg capsule Commonly known as:  PROzac Take 1 Cap by mouth daily. Indications: ANXIETY WITH DEPRESSION  
  
 metoprolol succinate 50 mg XL tablet Commonly known as:  TOPROL-XL Take 1 Tab by mouth daily. metoprolol tartrate 50 mg tablet Commonly known as:  LOPRESSOR Take 50 mg by mouth.  
  
 montelukast 10 mg tablet Commonly known as:  SINGULAIR Take 10 mg by mouth nightly. MULTI-VITAMIN PO Take 1 Tab by mouth daily. naproxen 500 mg tablet Commonly known as:  NAPROSYN  
  
 traZODone 100 mg tablet Commonly known as:  DESYREL  
TAKE 1 TABLET BY MOUTH AT BEDTIME FOR INSOMNIA  Indications: insomnia associated with depression, insomnia VITAMIN B-12 PO Take  by mouth daily. We Performed the Following AMB POC EKG ROUTINE W/ 12 LEADS, INTER & REP [65968 CPT(R)] Introducing [facility] SERVICES! Dear Kumar Martinez: 
Thank you for requesting a [employer] account. Our records indicate that you already have an active [employer] account. You can access your account anytime at [email or web address] Did you know that you can access your hospital and ER discharge instructions at any time in [employer]perium? You can also review all of your test results from your hospital stay or ER visit. Additional Information If you have questions, please visit the Frequently Asked Questions section of the [employer] website at [email or web address] Remember, [employer] is NOT to be used for urgent needs. For medical emergencies, dial 911. Now available from your iPhone and Android! Please provide this summary of care documentation to your next provider. Your primary care clinician is listed as Shoshana Schmitt. If you have any questions after today's visit, please call [phone].

## 2017-09-20 NOTE — PROGRESS NOTES
Misael Delarosa     1955       aKia Kinsey MD, [facility]  Date of Visit-9/20/2017   PCP is Roddy Godoy MD   [facility]  [facility]  HPI:  Misael Delarosa is a 63 y.o. female   Pt followed by Dr. Aaron Mejia last seen in April 2017 with HTN and HLD, is here for pre-op evaluation. Heart catherization 9/23/15 with a myocardial bridge mid LAD and normal EF. Pt had hip surgery in 2017 that went well, performed at [facility]. Pt is scheduled for total left knee replacement on September 25, 2017. Overall, she has been feeling well. She does not take an aspirin or cholesterol medication. She feels chest pain infrequently. he feels SOB w/ exertion occasionally, which is attributed to her COPD. She denies any FMHx of heart problems. Denies edema, syncope, has no tachycardia, palpitations or sense of arrhythmia. EKG: Sinus Bradycardia with nonspecific T wave flattening. Assessment/Plan:       1. Pre-op: She appears to be low risk for cardiac events, no epicardial disease, tolerated a similar surgery well a few months ago. Bradycardia on ekg is from beta blocker, as would be expected. Can continue beta blocker perioperatively. 2. Myocardial bridge in LAD: We discussed and referenced diagrams    3. COPD: Seems mild, beta blocker is not exacerbating. 4. HTN: At goal    5. Dyslipidemia: Followed by PCP. Should have appointment with Dr. Aaron Mejia in 6 months. Future Appointments  Date Time Provider Marlon Quick   10/2/2017 11:45 AM Rachel Noble MD [address] [facility] [address]   10/10/2017 10:40 AM Ramy Bonilla MD [facility] KENDAL SCHED   3/20/2018 11:40 AM Rajendra Dietz MD [facility] KENDAL SCHED       Impression:   1. Coronary-myocardial bridge    2. Essential hypertension    3. Pure hypercholesterolemia    4. Chronic obstructive pulmonary disease, unspecified COPD type ([address]    5. Pre-operative cardiovascular examination    6.  GRANADOS (dyspnea on exertion)       Cardiac History:   1. Cardiac Cath  9/23/15-   1) LM long,large no dz  2) LAD moderate caliber course to the apex. There is some myocardial  bridging in the mid segment of the LAD. No disease. Diagonal without disease. DI high take off and no disease  3) Circumflex -mod -> small no disease  4) RCA is large dominant with no disease and the PDA and PL are free of  Disease    2. Echo  7/1/15- EF 65%    3. Lexiscan  8/21/15- mod size, mod-severe grade, predominantly reversible defect involving the apex and distal inferior wall, mod extent of ischemia    4. Lipids  4/5/17- , HDL 66, LDL 99, TG 88     ROS-except as noted above. . A complete cardiac and respiratory are reviewed and negative except as above ; Resp-denies wheezing  or productive cough,. Const- No unusual weight loss or fever; Neuro-no recent seizure or CVA ; GI- No BRBPR, abdom pain, bloating ; - no  hematuria   see supplement sheet, initialed and to be scanned by staff  Past Medical History:   Diagnosis Date    Anxiety     Arthritis     osteoarthritis    Asthma     Chronic pain     bilateral knees, lower back, neck    COPD (chronic obstructive pulmonary disease) ([address]     Depression     Diabetes ([address]     diet controlled    Fatigue     FH: mental illness     GERD (gastroesophageal reflux disease)     Hypertension     Ill-defined condition     essential tremor    Morbid obesity ([address]     Muscle pain     Ringing in ears     Shingles 2/2016    Sleep apnea     Sleep apnea     no CPAP  2 liters 02 via nc hs    Snoring     SOB (shortness of breath)       Social Hx= reports that she has never smoked. She has never used smokeless tobacco. She reports that she does not drink alcohol or use illicit drugs. Exam and Labs: There were no vitals taken for this visit. Constitutional:  NAD, comfortable  Head: NC,AT. Eyes: No scleral icterus. Neck:  Neck supple, no carotid bruit. No JVD present. Throat: moist mucous membranes. Chest: Effort normal & normal respiratory excursion . Neurological: alert, conversant and oriented . Skin: Skin is not cold. No obvious systemic rash noted. Not diaphoretic. No erythema. Psychiatric:  Grossly normal mood and affect. Behavior appears normal. Extremities:  no clubbing or cyanosis. Abdomen: non distended    Lungs:breath sounds normal. No stridor. distress, wheezes or  Rales. Heart:    normal rate, regular rhythm, normal S1, S2, no murmurs, rubs, clicks or gallops , PMI non displaced. Edema: Edema is none. Lab Results   Component Value Date/Time    Cholesterol, total 207 06/21/2016 09:20 AM    HDL Cholesterol 50 07/13/2015 09:45 AM    LDL, calculated 128 07/13/2015 09:45 AM    Triglyceride 105 07/13/2015 09:45 AM     No results found for this or any previous visit. Lab Results   Component Value Date/Time    Sodium 144 11/11/2016 01:13 AM    Potassium 3.6 11/11/2016 01:13 AM    Chloride 113 11/11/2016 01:13 AM    CO2 24 11/11/2016 01:13 AM    Anion gap 7 11/11/2016 01:13 AM    Glucose 103 11/11/2016 01:13 AM    BUN 17 11/11/2016 01:13 AM    Creatinine 0.73 11/11/2016 01:13 AM    BUN/Creatinine ratio 23 11/11/2016 01:13 AM    GFR est AA >60 11/11/2016 01:13 AM    GFR est non-AA >60 11/11/2016 01:13 AM    Calcium 8.2 11/11/2016 01:13 AM      Wt Readings from Last 3 Encounters:   08/08/17 182 lb (82.6 kg)   08/01/17 183 lb (83 kg)   04/14/17 178 lb (80.7 kg)      BP Readings from Last 3 Encounters:   08/08/17 150/90   08/01/17 145/68   05/01/17 132/87        Current Outpatient Prescriptions   Medication Sig    naproxen (NAPROSYN) 500 mg tablet     traZODone (DESYREL) 100 mg tablet TAKE 1 TABLET BY MOUTH AT BEDTIME FOR INSOMNIA  Indications: insomnia associated with depression, insomnia    busPIRone (BUSPAR) 30 mg tablet Take 1 Tab by mouth two (2) times a day. Indications: GENERALIZED ANXIETY DISORDER    FLUoxetine (PROZAC) 20 mg capsule Take 1 Cap by mouth daily.  Indications: ANXIETY WITH DEPRESSION    buPROPion (WELLBUTRIN) 75 mg tablet Take 1 Tab by mouth daily. Indications: ANXIETY WITH DEPRESSION    topiramate (TOPAMAX) 100 mg tablet Take 1 Tab by mouth nightly.  oxyCODONE IR (ROXICODONE) 5 mg immediate release tablet Take 1 Tab by mouth every six (6) hours as needed. Max Daily Amount: 20 mg.    gabapentin (NEURONTIN) 300 mg capsule Take 300 mg by mouth three (3) times daily.  meloxicam (MOBIC) 15 mg tablet Take 15 mg by mouth daily. Indications: OSTEOARTHRITIS    omeprazole (PRILOSEC) 20 mg capsule Take 20 mg by mouth daily.  MULTIVITAMINS WITH FLUORIDE (MULTI-VITAMIN PO) Take 1 Tab by mouth daily.  CALCIUM PO Take 500 mg by mouth daily.  CYANOCOBALAMIN, VITAMIN B-12, (VITAMIN B-12 PO) Take  by mouth daily.  budesonide-formoterol (SYMBICORT) 160-4.5 mcg/actuation HFA inhaler Take 2 Puffs by inhalation two (2) times a day.  metoprolol succinate (TOPROL-XL) 50 mg XL tablet Take 1 Tab by mouth daily. (Patient taking differently: Take 50 mg by mouth nightly.)    ipratropium-albuterol (COMBIVENT RESPIMAT)  mcg/actuation inhaler Take 1 puff by inhalation as needed for Wheezing.  montelukast (SINGULAIR) 10 mg tablet Take 10 mg by mouth nightly. No current facility-administered medications for this visit. Impression see above.     Written by Arash Clark, as dictated by Blair Lauren MD.

## 2017-09-22 ENCOUNTER — TELEPHONE (OUTPATIENT)
Dept: CARDIOLOGY CLINIC | Age: 62
End: 2017-09-22

## 2017-09-22 NOTE — TELEPHONE ENCOUNTER
Letter completed by Dr. Krystle Wright and has been faxed to [facility] at [phone].

## 2017-09-22 NOTE — TELEPHONE ENCOUNTER
Francisco J Calhoun from [facility] is calling to get pt's last office note from [place], Sept 20. Pt was in for cardiac clearance and has her knee replacement procedure on Monday, Sept 25. Please fax last office note to [phone].     Thank you,  Craig Roberts

## 2017-09-22 NOTE — LETTER
9/22/2017 9:16 AM 
 
Ms. Lilly Melo [address] [address] Dear Brandon Richardson- Dr Lynette Walsh Lilly Melo is a 63 y.o. female 1955 was seen in office recently. I have no objection to the planned  surgery. Patient seems to be at a low risk for joel-operative severe adverse cardiac events. She has a myocardial bridge in the LAD found a few years ago by DR Roscoe Mcclellan and has been stable with no cardiac symptoms. has a past medical history of Anxiety; Arthritis; Asthma; Chronic pain; COPD (chronic obstructive pulmonary disease) ([address] Depression; Diabetes ([address] Fatigue; FH: mental illness; GERD (gastroesophageal reflux disease); Hypertension; Ill-defined condition; Morbid obesity ([address] Muscle pain; Ringing in ears; Shingles (2/2016); Sleep apnea; Sleep apnea; Snoring; and SOB (shortness of breath). 1. Cardiac Cath 
9/23/15-  
1) LM long,large no dz 2) LAD moderate caliber course to the apex. There is some myocardial 
bridging in the mid segment of the LAD. No disease. Diagonal without disease. DI high take off and no disease 3) Circumflex -mod -> small no disease 4) RCA is large dominant with no disease and the PDA and PL are free of 
Disease 2. Echo 
7/1/15- EF 65% 3. Lexiscan 
8/21/15- mod size, mod-severe grade, predominantly reversible defect involving the apex and distal inferior wall, mod extent of ischemia 4. Lipids 4/5/17- , HDL 66, LDL 99, TG 88 Dr Roscoe Mcclellan will see her in office follow up Future Appointments Date Time Provider Marlon Ynes 10/2/2017 11:45 AM Erinn Munson MD [address] [facility] [address]  
10/10/2017 10:40 AM Justine Michel MD [facility] KENDAL SCHED  
3/20/2018 11:40 AM Ibrahima Shaikh MD [address] [facility] Sincerely, 
 
 
Yaquelin Martin MD

## 2017-09-24 PROBLEM — Q24.5 CORONARY-MYOCARDIAL BRIDGE: Status: ACTIVE | Noted: 2017-09-24

## 2017-10-06 RX ORDER — TRAZODONE HYDROCHLORIDE 100 MG/1
TABLET ORAL
Qty: 30 TAB | Refills: 0 | Status: SHIPPED | OUTPATIENT
Start: 2017-10-06 | End: 2017-11-21 | Stop reason: SDUPTHER

## 2017-11-30 ENCOUNTER — OFFICE VISIT (OUTPATIENT)
Dept: BEHAVIORAL/MENTAL HEALTH CLINIC | Age: 62
End: 2017-11-30

## 2017-11-30 VITALS
WEIGHT: 185 LBS | BODY MASS INDEX: 34.04 KG/M2 | DIASTOLIC BLOOD PRESSURE: 96 MMHG | SYSTOLIC BLOOD PRESSURE: 136 MMHG | HEIGHT: 62 IN | HEART RATE: 91 BPM

## 2017-11-30 DIAGNOSIS — F33.1 MODERATE EPISODE OF RECURRENT MAJOR DEPRESSIVE DISORDER (HCC): Primary | ICD-10-CM

## 2017-11-30 DIAGNOSIS — F41.9 ANXIETY DISORDER, UNSPECIFIED TYPE: ICD-10-CM

## 2017-11-30 DIAGNOSIS — G47.00 INSOMNIA, UNSPECIFIED TYPE: ICD-10-CM

## 2017-11-30 RX ORDER — BUPROPION HYDROCHLORIDE 100 MG/1
100 TABLET, EXTENDED RELEASE ORAL DAILY
Qty: 30 TAB | Refills: 1 | Status: SHIPPED | OUTPATIENT
Start: 2017-11-30 | End: 2021-12-09

## 2017-11-30 RX ORDER — BUSPIRONE HYDROCHLORIDE 15 MG/1
15 TABLET ORAL 2 TIMES DAILY
Qty: 60 TAB | Refills: 1 | Status: SHIPPED | OUTPATIENT
Start: 2017-11-30 | End: 2021-12-09

## 2017-11-30 RX ORDER — DICLOFENAC SODIUM 75 MG/1
TABLET, DELAYED RELEASE ORAL
Refills: 2 | COMMUNITY
Start: 2017-11-17 | End: 2021-12-09

## 2017-11-30 RX ORDER — TRAZODONE HYDROCHLORIDE 100 MG/1
TABLET ORAL
Qty: 30 TAB | Refills: 1 | Status: SHIPPED | OUTPATIENT
Start: 2017-11-30

## 2017-11-30 RX ORDER — ASPIRIN 81 MG/1
TABLET ORAL
Refills: 0 | COMMUNITY
Start: 2017-09-29 | End: 2021-12-09

## 2017-11-30 NOTE — MR AVS SNAPSHOT
Visit Information Date & Time Provider Department Dept. Phone Encounter #  
 11/30/2017 10:30 AM Jeff Parker MD Lily Johnson [phone] [id number] Follow-up Instructions Return in about 6 weeks (around 1/11/2018). Your Appointments 3/20/2018 11:40 AM  
ESTABLISHED PATIENT with Mariajose Abrams MD  
[facility] ([facility] SCHEDULING) Appt Note: 6 mo fup  
 320 [facility] 600 1007 [facility]  
54 [facility] [address] [address] Upcoming Health Maintenance Date Due Hepatitis C Screening 1955 Pneumococcal 19-64 Medium Risk (1 of 1 - PPSV23) 7/31/1976 DTaP/Tdap/Td series (1 - Tdap) 7/31/1978 PAP AKA CERVICAL CYTOLOGY 7/31/1978 BREAST CANCER SCRN MAMMOGRAM 7/31/2007 FOBT Q 1 YEAR AGE 50-75 7/31/2007 ZOSTER VACCINE AGE 60> 5/31/2017 Influenza Age 5 to Adult 8/1/2017 Allergies as of 11/30/2017  Review Complete On: 11/30/2017 By: Jeff Parker MD  
  
 Severity Noted Reaction Type Reactions Penicillin G Benzathin,procain  01/24/2013    Hives Current Immunizations  Never Reviewed No immunizations on file. Not reviewed this visit You Were Diagnosed With   
  
 Codes Comments Moderate episode of recurrent major depressive disorder ([facility]    -  Primary ICD-10-CM: F33.1 ICD-9-CM: 296.32 Anxiety disorder, unspecified type     ICD-10-CM: F41.9 ICD-9-CM: 300.00 Insomnia, unspecified type     ICD-10-CM: G47.00 ICD-9-CM: 780.52 Vitals BP Pulse Height(growth percentile) Weight(growth percentile) BMI OB Status (!) 136/96 91 5' 2\" (1.575 m) 185 lb (83.9 kg) 33.84 kg/m2 Hysterectomy Smoking Status Never Smoker Vitals History BMI and BSA Data Body Mass Index Body Surface Area  
 33.84 kg/m 2 1.92 m 2 Preferred Pharmacy Pharmacy Name Phone Abby Garcia [address] [place] [address] AT [facility] OF  [place]rent [facility] [phone] Your Updated Medication List  
  
   
This list is accurate as of: 11/30/17 11:12 AM.  Always use your most recent med list.  
  
  
  
  
 albuterol 2.5 mg /3 mL (0.083 %) nebulizer solution Commonly known as:  PROVENTIL VENTOLIN Take 2.5 mg by inhalation. aspirin delayed-release 81 mg tablet TAKE 1 TABLET BY MOUTH TWICE DAILY MEALS FOR DVT PROPHYLAXIS  
  
 buPROPion  mg SR tablet Commonly known as:  Evlyn Sill Take 1 Tab by mouth daily. Indications: ANXIETY WITH DEPRESSION  
  
 busPIRone 15 mg tablet Commonly known as:  BUSPAR Take 1 Tab by mouth two (2) times a day. CALCIUM PO Take 500 mg by mouth daily. COMBIVENT RESPIMAT  mcg/actuation inhaler Generic drug:  ipratropium-albuterol Take 1 puff by inhalation as needed for Wheezing. diclofenac EC 75 mg EC tablet Commonly known as:  VOLTAREN  
TK 1 T PO BID WF  
  
 metoprolol succinate 50 mg XL tablet Commonly known as:  TOPROL-XL Take 1 Tab by mouth daily. montelukast 10 mg tablet Commonly known as:  SINGULAIR Take 10 mg by mouth nightly. MULTI-VITAMIN PO Take 1 Tab by mouth daily. naproxen 500 mg tablet Commonly known as:  NAPROSYN  
  
 traZODone 100 mg tablet Commonly known as:  DESYREL  
TAKE 1 TABLET BY MOUTH AT BEDTIME AS NEEDED FOR INSOMNIA  Indications: insomnia associated with depression, insomnia VITAMIN B-12 PO Take  by mouth daily. Prescriptions Sent to Pharmacy Refills  
 traZODone (DESYREL) 100 mg tablet 1 Sig: TAKE 1 TABLET BY MOUTH AT BEDTIME AS NEEDED FOR INSOMNIA  Indications: insomnia associated with depression, insomnia Class: Normal  
 Pharmacy: [facility] [place], [address] [facility] Ph #: [phone] busPIRone (BUSPAR) 15 mg tablet 1 Sig: Take 1 Tab by mouth two (2) times a day. Class: Normal  
 [facility] [address] Ph #: [phone] Route: Oral  
 buPROPion SR (WELLBUTRIN SR) 100 mg SR tablet 1 Sig: Take 1 Tab by mouth daily. Indications: ANXIETY WITH DEPRESSION Class: Normal  
 Pharm[facility] [address] Ph #: [phone] Route: Oral  
  
Follow-up Instructions Return in about 6 weeks (around 1/11/2018). Introducing [facility]! Dear Mike Jennings: 
Thank you for requesting a [employer] account. Our records indicate that you already have an active [employer] account. You can access your account anytime at [email or web address] Did you know that you can access your hospital and ER discharge instructions at any time in [employer]? You can also review all of your test results from your hospital stay or ER visit. Additional Information If you have questions, please visit the Frequently Asked Questions section of the [employer] website at [email or web address] Remember, [employer] is NOT to be used for urgent needs. For medical emergencies, dial 911. Now available from your iPhone and Android! Please provide this summary of care documentation to your next provider. Your primary care clinician is listed as Joanie Mancuso. If you have any questions after today's visit, please call [phone].

## 2017-12-01 NOTE — PROGRESS NOTES
Psychiatric Progress Note    Date: 11/30/2017  Account Number:  [id number]  Name: Eunice Healy    Length of psychotherapy session: 15 minutes     Total Patient Care Time Spent: 20 minutes : (Coordinated care:  counseling time with patient, individual psychotherapy with patient; discussions with family members and chart review). SUBJECTIVE:   Eunice Healy  is a 63 y.o.  female  patient presents for a therapy/psychopharmacological management appointment. Pt was last seen in August, since then she had her knee surgery, currently recovering from the surgery and in rehab. Has not taken her psych meds for the last couple of months. Feels unmotivated, having no interest in anything, states she feels that she has lost desire, reports high anxiety, she is not sure if these symptoms are due to her depression or sec to her recent surgery. Pt has been exhibiting resistance in accepting her mental illness since first seen in this practice. Provided support and encouragement. And recommended medication management and psychotherapy. Of note pt was walking on her own without any aid. Patient denies SI/HI/SIB. No evidence of AH/VH or delusions.       Appetite:no change from normal   Sleep: good    Response to Treatment: Positive   Side Effects: none      Supportive/Cognitive/Reality-Oriented psychotherapy provided in regards to psychosocial stressors:   pre-admission and current problems   Housing issues   Occupational issues   Academic issues   Legal issues   Medical issues   Interpersonal conflicts   Stress of hospitalization  Psychoeducation provided  Treatment plan reviewed with patient-including diagnosis and medications  Worked on issues of denial & effects of substance dependency/use      OBJECTIVE:                 Mental Status exam: WNL except for      Sensorium  oriented to time, place and person   Relations cooperative    Eye Contact    appropriate   Appearance:  age appropriate, casually dressed and overweight, walking with cane   Motor Behavior:  within normal limits/walking with cane    Speech:  normal pitch and normal volume   Thought Process: within normal limits   Thought Content free of delusions and free of hallucinations   Suicidal ideations none   Homicidal ideations none   Mood:  Anxious and no desire   Affect:  mood congruent/constricted    Memory recent  adequate   Memory remote:  adequate   Concentration:  adequate   Abstraction:  abstract   Insight:  fair   Reliability fair   Judgment:  fair       Allergies   Allergen Reactions    Penicillin G Benzathin,Procain Hives        Current Outpatient Prescriptions   Medication Sig Dispense Refill    diclofenac EC (VOLTAREN) 75 mg EC tablet TK 1 T PO BID WF  2    aspirin delayed-release 81 mg tablet TAKE 1 TABLET BY MOUTH TWICE DAILY MEALS FOR DVT PROPHYLAXIS  0    traZODone (DESYREL) 100 mg tablet TAKE 1 TABLET BY MOUTH AT BEDTIME AS NEEDED FOR INSOMNIA  Indications: insomnia associated with depression, insomnia 30 Tab 1    busPIRone (BUSPAR) 15 mg tablet Take 1 Tab by mouth two (2) times a day. 60 Tab 1    buPROPion SR (WELLBUTRIN SR) 100 mg SR tablet Take 1 Tab by mouth daily. Indications: ANXIETY WITH DEPRESSION 30 Tab 1    albuterol (PROVENTIL VENTOLIN) 2.5 mg /3 mL (0.083 %) nebulizer solution Take 2.5 mg by inhalation.  naproxen (NAPROSYN) 500 mg tablet       MULTIVITAMINS WITH FLUORIDE (MULTI-VITAMIN PO) Take 1 Tab by mouth daily.  CALCIUM PO Take 500 mg by mouth daily.  CYANOCOBALAMIN, VITAMIN B-12, (VITAMIN B-12 PO) Take  by mouth daily.  metoprolol succinate (TOPROL-XL) 50 mg XL tablet Take 1 Tab by mouth daily. (Patient taking differently: Take 50 mg by mouth nightly.) 30 Tab 5    ipratropium-albuterol (COMBIVENT RESPIMAT)  mcg/actuation inhaler Take 1 puff by inhalation as needed for Wheezing.  montelukast (SINGULAIR) 10 mg tablet Take 10 mg by mouth nightly.           Medication Changes/Adjustments:   Medications Discontinued During This Encounter   Medication Reason    metoprolol tartrate (LOPRESSOR) 50 mg tablet Duplicate Order    FLUoxetine (PROZAC) 20 mg capsule Not A Current Medication    buPROPion (WELLBUTRIN) 75 mg tablet Not A Current Medication    busPIRone (BUSPAR) 30 mg tablet Not A Current Medication    traZODone (DESYREL) 100 mg tablet Reorder          ASSESSMENT:  Jamie Villalta  is a 63 y.o.  female patient presented for her f/u visit, doing better since surgery, not on meds for the last couple of yrs, will restart meds slowly. Diagnoses:   Axis I: Major Depressive d/o, recurrent, moderate   Anxiety d/o   Axis II: Deferred   Axis III: Asthma, Sleep Apnea   Axis IV: Moderate  Axis V:  55-60    RECOMMENDATIONS/PLAN:  1. Medications: Medications reviewed, start pt on Buspar 15mg BID, Wellbutrin SR 100mg daily, continue Trazodone for sleep as needed. Orders Placed This Encounter    diclofenac EC (VOLTAREN) 75 mg EC tablet    aspirin delayed-release 81 mg tablet    traZODone (DESYREL) 100 mg tablet    busPIRone (BUSPAR) 15 mg tablet    buPROPion SR (WELLBUTRIN SR) 100 mg SR tablet      2. Follow-up Disposition:  Return in about 6 weeks (around 1/11/2018).

## 2018-01-23 ENCOUNTER — OFFICE VISIT (OUTPATIENT)
Dept: NEUROLOGY | Age: 63
End: 2018-01-23

## 2018-01-23 VITALS
HEART RATE: 63 BPM | OXYGEN SATURATION: 99 % | BODY MASS INDEX: 35.33 KG/M2 | SYSTOLIC BLOOD PRESSURE: 130 MMHG | WEIGHT: 192 LBS | HEIGHT: 62 IN | DIASTOLIC BLOOD PRESSURE: 90 MMHG | RESPIRATION RATE: 18 BRPM

## 2018-01-23 DIAGNOSIS — G25.0 ESSENTIAL TREMOR: ICD-10-CM

## 2018-01-23 DIAGNOSIS — H53.19 VISUAL DISTORTION: ICD-10-CM

## 2018-01-23 DIAGNOSIS — R51.9 NEW ONSET OF HEADACHES AFTER AGE 50: Primary | ICD-10-CM

## 2018-01-23 NOTE — PATIENT INSTRUCTIONS
Information Regarding Testing     If you have physican order for a test or a medication denied by your insurance company, this does not mean the test or medication is not appropriate for you as that is a medical decision, not a decision to be made by an insurance company representative or by an [facility] physician who has not interviewed and examined you. This is a decision to be made between you and your physician. The denial of services is a contractual matter between you and your insurance company, not an issue between your physician and the insurance company. If your test or medication is denied, you can take the following steps to help resolve the issue:    1. File a complaint with the [facility] regarding your insurance company's denial of services ordered for you. You can do this either by calling them directly or by completing an on-line complaint form on the [employer]. This can be found at [email or web address]    2. Also file a formal complaint with your insurance company and ask to have the name of the person denying the service so that you may explore a legal option should you be harmed by this denial of service. Again, the fact the insurance company will not pay for the service does not mean it is not medically necessary and I would encourage you to follow through with the plan that was made with your physician    3. File a written complaint with your employer so your employer and benefit manager is aware of the poor coverage they are providing their employees. If you have medicare/medicaid, complain to your representative in the House and to your Freddy Ashraf.     [facility]   Statement to Patients  April 1, 2014      In an effort to ensure the large volume of patient prescription refills is processed in the most efficient and expeditious manner, we are asking our patients to assist us by calling your Pharmacy for all prescription refills, this will include also your  Mail Order Pharmacy. The pharmacy will contact our office electronically to continue the refill process. Please do not wait until the last minute to call your pharmacy. We need at least 48 hours (2days) to fill prescriptions. We also encourage you to call your pharmacy before going to  your prescription to make sure it is ready. With regard to controlled substance prescription refill requests (narcotic refills) that need to be picked up at our office, we ask your cooperation by providing us with at least 72 hours (3days) notice that you will need a refill. We will not refill narcotic prescription refill requests after 4:00pm on any weekday, Monday through Thursday, or after 2:00pm on Fridays, or on the weekends. We encourage everyone to explore another way of getting your prescription refill request processed using [employer], our patient web portal through our electronic medical record system. [employer] is an efficient and effective way to communicate your medication request directly to the office and  downloadable as an maury on your smart phone . [employer] also features a review functionality that allows you to view your medication list as well as leave messages for your physician. Are you ready to get connected? If so please review the attatched instructions or speak to any of our staff to get you set up right away! Thank you so much for your cooperation. Should you have any questions please contact our Practice Administrator.     The Physicians and Staff,  [facility]   Statement to Patients  April 1, 2014      In an effort to ensure the large volume of patient prescription refills is processed in the most efficient and expeditious manner, we are asking our patients to assist us by calling your Pharmacy for all prescription refills, this will include also your  Mail Order Pharmacy. The pharmacy will contact our office electronically to continue the refill process. Please do not wait until the last minute to call your pharmacy. We need at least 48 hours (2days) to fill prescriptions. We also encourage you to call your pharmacy before going to  your prescription to make sure it is ready. With regard to controlled substance prescription refill requests (narcotic refills) that need to be picked up at our office, we ask your cooperation by providing us with at least 72 hours (3days) notice that you will need a refill. We will not refill narcotic prescription refill requests after 4:00pm on any weekday, Monday through Thursday, or after 2:00pm on Fridays, or on the weekends. We encourage everyone to explore another way of getting your prescription refill request processed using [employer], our patient web portal through our electronic medical record system. [employer] is an efficient and effective way to communicate your medication request directly to the office and  downloadable as an maury on your smart phone . [employer] also features a review functionality that allows you to view your medication list as well as leave messages for your physician. Are you ready to get connected? If so please review the attatched instructions or speak to any of our staff to get you set up right away! Thank you so much for your cooperation. Should you have any questions please contact our Practice Administrator. The Physicians and Staff,  [facility]       If we have ordered testing for you, we do not call patients with results and we do not give test results over the phone. We schedule follow up appointments so that your results can be discussed in person and any questions you have regarding them may be addressed. If something of concern is revealed on your test, we will call you for a sooner follow up appointment.   Additionally, results may be found by using the My Chart feature and one of our patient service representatives at the  can give you instructions on how to access this feature of our electronic medical record system. Learning About [employer]in Onof[employer]  What is a living will? A living will is a legal form you use to write down the kind of care you want at the end of your life. It is used by the health professionals who will treat you if you aren't able to decide for yourself. If you put your wishes in writing, your loved ones and others will know what kind of care you want. They won't need to guess. This can ease your mind and be helpful to others. A living will is not the same as an estate or property will. An estate will explains what you want to happen with your money and property after you die. Is a living will a legal document? A living will is a legal document. Each state has its own laws about living pagan. If you move to another state, make sure that your living will is legal in the state where you now live. Or you might use a universal form that has been approved by many states. This kind of form can sometimes be completed and stored online. Your electronic copy will then be available wherever you have a connection to the Internet. In most cases, doctors will respect your wishes even if you have a form from a different state. · You don't need an  to complete a living will. But legal advice can be helpful if your state's laws are unclear, your health history is complicated, or your family can't agree on what should be in your living will. · You can change your living will at any time. Some people find that their wishes about end-of-life care change as their health changes. · In addition to making a living will, think about completing a medical power of  form. This form lets you name the person you want to make end-of-life treatment decisions for you (your \"health care agent\") if you're not able to.  Many hospitals and nursing homes will give you the forms you need to complete a living will and a medical power of . · Your living will is used only if you can't make or communicate decisions for yourself anymore. If you become able to make decisions again, you can accept or refuse any treatment, no matter what you wrote in your living will. · Your state may offer an online registry. This is a place where you can store your living will online so the doctors and nurses who need to treat you can find it right away. What should you think about when creating a living will? Talk about your end-of-life wishes with your family members and your doctor. Let them know what you want. That way the people making decisions for you won't be surprised by your choices. Think about these questions as you make your living will:  · Do you know enough about life support methods that might be used? If not, talk to your doctor so you know what might be done if you can't breathe on your own, your heart stops, or you're unable to swallow. · What things would you still want to be able to do after you receive life-support methods? Would you want to be able to walk? To speak? To eat on your own? To live without the help of machines? · If you have a choice, where do you want to be cared for? In your home? At a hospital or nursing home? · Do you want certain Sabianism practices performed if you become very ill? · If you have a choice at the end of your life, where would you prefer to die? At home? In a hospital or nursing home? Somewhere else? · Would you prefer to be buried or cremated? · Do you want your organs to be donated after you die? What should you do with your living will? · Make sure that your family members and your health care agent have copies of your living will. · Give your doctor a copy of your living will to keep in your medical record.  If you have more than one doctor, make sure that each one has a copy.  · You may want to put a copy of your living will where it can be easily found. Where can you learn more? Go to http://guido-sussy.info/. Enter C905 in the search box to learn more about \"Learning About Living Winddarrick Petty. \"  Current as of: September 24, 2016  Content Version: 11.4  © 2450-8993 [employer]. Care instructions adapted under license by [employer] (which disclaims liability or warranty for this information). If you have questions about a medical condition or this instruction, always ask your healthcare professional. [place] [address] any warranty or liability for your use of this information.

## 2018-01-23 NOTE — MR AVS SNAPSHOT
[id number] [facility] 
[phone] Patient: Kelly December MRN: [id number] Visit Information Date & Time Provider Department Dept. Phone Encounter #  
 1/23/2018  9:40 AM Raul Puentes  [facility] [phone] [id number] Follow-up Instructions Return for After Tests. Your Appointments 3/20/2018 11:40 AM  
ESTABLISHED PATIENT with Jamal Alvares MD  
[facility] ([facility] SCHEDULING) Appt Note: 6 mo fup  
 320 [facility] 600 1007 [address] [address] [address] Upcoming Health Maintenance Date Due Hepatitis C Screening 1955 Pneumococcal 19-64 Medium Risk (1 of 1 - PPSV23) 7/31/1976 DTaP/Tdap/Td series (1 - Tdap) 7/31/1978 PAP AKA CERVICAL CYTOLOGY 7/31/1978 BREAST CANCER SCRN MAMMOGRAM 7/31/2007 FOBT Q 1 YEAR AGE 50-75 7/31/2007 ZOSTER VACCINE AGE 60> 5/31/2017 Influenza Age 5 to Adult 8/1/2017 Allergies as of 1/23/2018  Review Complete On: 1/23/2018 By: Carmelita Chino LPN Severity Noted Reaction Type Reactions Penicillin G Benzathin,procain  01/24/2013    Hives Current Immunizations  Never Reviewed No immunizations on file. Not reviewed this visit You Were Diagnosed With   
  
 Codes Comments New onset of headaches after age 48    -  Primary ICD-10-CM: R46 ICD-9-CM: 513. 0 Visual distortion     ICD-10-CM: H53.19 ICD-9-CM: 368.15 Essential tremor     ICD-10-CM: G25.0 ICD-9-CM: 333.1 Vitals BP Pulse Resp Height(growth percentile) Weight(growth percentile) SpO2  
 130/90 63 18 5' 2\" (1.575 m) 192 lb (87.1 kg) 99% BMI OB Status Smoking Status 35.12 kg/m2 Hysterectomy Never Smoker Vitals History BMI and BSA Data Body Mass Index Body Surface Area  
 35.12 kg/m 2 1.95 m 2 Preferred Pharmacy Pharmacy Name Phone Kiran Petty [address] [facility]  [facility] [phone] Your Updated Medication List  
  
   
This list is accurate as of: 1/23/18 10:20 AM.  Always use your most recent med list.  
  
  
  
  
 albuterol 2.5 mg /3 mL (0.083 %) nebulizer solution Commonly known as:  PROVENTIL VENTOLIN Take 2.5 mg by inhalation. aspirin delayed-release 81 mg tablet TAKE 1 TABLET BY MOUTH TWICE DAILY MEALS FOR DVT PROPHYLAXIS  
  
 buPROPion  mg SR tablet Commonly known as:  [place] Rouleau Take 1 Tab by mouth daily. Indications: ANXIETY WITH DEPRESSION  
  
 busPIRone 15 mg tablet Commonly known as:  BUSPAR Take 1 Tab by mouth two (2) times a day. CALCIUM PO Take 500 mg by mouth daily. COMBIVENT RESPIMAT  mcg/actuation inhaler Generic drug:  ipratropium-albuterol Take 1 puff by inhalation as needed for Wheezing. diclofenac EC 75 mg EC tablet Commonly known as:  VOLTAREN  
TK 1 T PO BID WF  
  
 metoprolol succinate 50 mg XL tablet Commonly known as:  TOPROL-XL Take 1 Tab by mouth daily. montelukast 10 mg tablet Commonly known as:  SINGULAIR Take 10 mg by mouth nightly. MULTI-VITAMIN PO Take 1 Tab by mouth daily. naproxen 500 mg tablet Commonly known as:  NAPROSYN  
  
 traZODone 100 mg tablet Commonly known as:  DESYREL  
TAKE 1 TABLET BY MOUTH AT BEDTIME AS NEEDED FOR INSOMNIA  Indications: insomnia associated with depression, insomnia VITAMIN B-12 PO Take  by mouth daily. Follow-up Instructions Return for After Tests. To-Do List   
 01/23/2018 Imaging:  DUPLEX CAROTID BILATERAL AMB NEURO   
  
 01/23/2018 Neurology:  EEG AMB NEURO   
  
 01/23/2018 Imaging:  MRI BRAIN W WO CONT Patient Instructions Information Regarding Testing If you have physican order for a test or a medication denied by your insurance company, this does not mean the test or medication is not appropriate for you as that is a medical decision, not a decision to be made by an insurance company representative or by an [facility] physician who has not interviewed and examined you. This is a decision to be made between you and your physician. The denial of services is a contractual matter between you and your insurance company, not an issue between your physician and the insurance company. If your test or medication is denied, you can take the following steps to help resolve the issue: 1. File a complaint with the [facility] regarding your insurance company's denial of services ordered for you. You can do this either by calling them directly or by completing an on-line complaint form on the [employer]. This can be found at [email or web address] 2. Also file a formal complaint with your insurance company and ask to have the name of the person denying the service so that you may explore a legal option should you be harmed by this denial of service. Again, the fact the insurance company will not pay for the service does not mean it is not medically necessary and I would encourage you to follow through with the plan that was made with your physician 3. File a written complaint with your employer so your employer and benefit manager is aware of the poor coverage they are providing their employees. If you have medicare/medicaid, complain to your representative in the House and to your Freddy Ashraf. PRESCRIPTION REFILL POLICY [facility] Statement to Patients April 1, 2014 In an effort to ensure the large volume of patient prescription refills is processed in the most efficient and expeditious manner, we are asking our patients to assist us by calling your Pharmacy for all prescription refills, this will include also your  Mail Order Pharmacy. The pharmacy will contact our office electronically to continue the refill process. Please do not wait until the last minute to call your pharmacy. We need at least 48 hours (2days) to fill prescriptions. We also encourage you to call your pharmacy before going to  your prescription to make sure it is ready. With regard to controlled substance prescription refill requests (narcotic refills) that need to be picked up at our office, we ask your cooperation by providing us with at least 72 hours (3days) notice that you will need a refill. We will not refill narcotic prescription refill requests after 4:00pm on any weekday, Monday through Thursday, or after 2:00pm on Fridays, or on the weekends. We encourage everyone to explore another way of getting your prescription refill request processed using [employer], our patient web portal through our electronic medical record system. [employer] is an efficient and effective way to communicate your medication request directly to the office and  downloadable as an maury on your smart phone . [employer] also features a review functionality that allows you to view your medication list as well as leave messages for your physician. Are you ready to get connected? If so please review the attatched instructions or speak to any of our staff to get you set up right away! Thank you so much for your cooperation. Should you have any questions please contact our Practice Administrator. The Physicians and Staff,  [facility] PRESCRIPTION REFILL POLICY [facility] Statement to Patients April 1, 2014 In an effort to ensure the large volume of patient prescription refills is processed in the most efficient and expeditious manner, we are asking our patients to assist us by calling your Pharmacy for all prescription refills, this will include also your  Mail Order Pharmacy. The pharmacy will contact our office electronically to continue the refill process. Please do not wait until the last minute to call your pharmacy. We need at least 48 hours (2days) to fill prescriptions. We also encourage you to call your pharmacy before going to  your prescription to make sure it is ready. With regard to controlled substance prescription refill requests (narcotic refills) that need to be picked up at our office, we ask your cooperation by providing us with at least 72 hours (3days) notice that you will need a refill. We will not refill narcotic prescription refill requests after 4:00pm on any weekday, Monday through Thursday, or after 2:00pm on Fridays, or on the weekends. We encourage everyone to explore another way of getting your prescription refill request processed using [employer], our patient web portal through our electronic medical record system. [employer] is an efficient and effective way to communicate your medication request directly to the office and  downloadable as an maury on your smart phone . [employer] also features a review functionality that allows you to view your medication list as well as leave messages for your physician. Are you ready to get connected? If so please review the attatched instructions or speak to any of our staff to get you set up right away! Thank you so much for your cooperation. Should you have any questions please contact our Practice Administrator. The Physicians and Staff,  [facility] If we have ordered testing for you, we do not call patients with results and we do not give test results over the phone. We schedule follow up appointments so that your results can be discussed in person and any questions you have regarding them may be addressed.   If something of concern is revealed on your test, we will call you for a sooner follow up appointment. Additionally, results may be found by using the My Chart feature and one of our patient service representatives at the  can give you instructions on how to access this feature of our electronic medical record system. Abby Montez 1721 What is a living will? A living will is a legal form you use to write down the kind of care you want at the end of your life. It is used by the health professionals who will treat you if you aren't able to decide for yourself. If you put your wishes in writing, your loved ones and others will know what kind of care you want. They won't need to guess. This can ease your mind and be helpful to others. A living will is not the same as an estate or property will. An estate will explains what you want to happen with your money and property after you die. Is a living will a legal document? A living will is a legal document. Each state has its own laws about living pagan. If you move to another state, make sure that your living will is legal in the state where you now live. Or you might use a universal form that has been approved by many states. This kind of form can sometimes be completed and stored online. Your electronic copy will then be available wherever you have a connection to the Internet. In most cases, doctors will respect your wishes even if you have a form from a different state. · You don't need an  to complete a living will. But legal advice can be helpful if your state's laws are unclear, your health history is complicated, or your family can't agree on what should be in your living will. · You can change your living will at any time. Some people find that their wishes about end-of-life care change as their health changes.  
· In addition to making a living will, think about completing a medical power of  form. This form lets you name the person you want to make end-of-life treatment decisions for you (your \"health care agent\") if you're not able to. Many hospitals and nursing homes will give you the forms you need to complete a living will and a medical power of . · Your living will is used only if you can't make or communicate decisions for yourself anymore. If you become able to make decisions again, you can accept or refuse any treatment, no matter what you wrote in your living will. · Your state may offer an online registry. This is a place where you can store your living will online so the doctors and nurses who need to treat you can find it right away. What should you think about when creating a living will? Talk about your end-of-life wishes with your family members and your doctor. Let them know what you want. That way the people making decisions for you won't be surprised by your choices. Think about these questions as you make your living will: · Do you know enough about life support methods that might be used? If not, talk to your doctor so you know what might be done if you can't breathe on your own, your heart stops, or you're unable to swallow. · What things would you still want to be able to do after you receive life-support methods? Would you want to be able to walk? To speak? To eat on your own? To live without the help of machines? · If you have a choice, where do you want to be cared for? In your home? At a hospital or nursing home? · Do you want certain Holiness practices performed if you become very ill? · If you have a choice at the end of your life, where would you prefer to die? At home? In a hospital or nursing home? Somewhere else? · Would you prefer to be buried or cremated? · Do you want your organs to be donated after you die? What should you do with your living will?  
· Make sure that your family members and your health care agent have copies of your living will. · Give your doctor a copy of your living will to keep in your medical record. If you have more than one doctor, make sure that each one has a copy. · You may want to put a copy of your living will where it can be easily found. Where can you learn more? Go to http://guido-sussy.info/. Enter I131 in the search box to learn more about \"Learning About Living Carol Gr. \" Current as of: September 24, 2016 Content Version: 11.4 © 1606-9525 [employer]. Care instructions adapted under license by [employer] (which disclaims liability or warranty for this information). If you have questions about a medical condition or this instruction, always ask your healthcare professional. [address] any warranty or liability for your use of this information. Introducing [facility] & HEALTH SERVICES! Dear Argelia Hurtado: 
Thank you for requesting a [employer] account. Our records indicate that you already have an active [employer] account. You can access your account anytime at [email or web address] Did you know that you can access your hospital and ER discharge instructions at any time in [employer]? You can also review all of your test results from your hospital stay or ER visit. Additional Information If you have questions, please visit the Frequently Asked Questions section of the [employer] website at [email or web address] Remember, [employer] is NOT to be used for urgent needs. For medical emergencies, dial 911. Now available from your iPhone and Android! Please provide this summary of care documentation to your next provider. Your primary care clinician is listed as Janey Aquino. If you have any questions after today's visit, please call [phone].

## 2018-01-23 NOTE — PROGRESS NOTES
[facility] [place] [address]   [place] [address]   [address]   [phone] [facility]   [phone] Fax               Chief Complaint   Patient presents with    Tremors     follow up     Current Outpatient Prescriptions   Medication Sig Dispense Refill    diclofenac EC (VOLTAREN) 75 mg EC tablet TK 1 T PO BID WF  2    traZODone (DESYREL) 100 mg tablet TAKE 1 TABLET BY MOUTH AT BEDTIME AS NEEDED FOR INSOMNIA  Indications: insomnia associated with depression, insomnia 30 Tab 1    busPIRone (BUSPAR) 15 mg tablet Take 1 Tab by mouth two (2) times a day. 60 Tab 1    buPROPion SR (WELLBUTRIN SR) 100 mg SR tablet Take 1 Tab by mouth daily. Indications: ANXIETY WITH DEPRESSION 30 Tab 1    albuterol (PROVENTIL VENTOLIN) 2.5 mg /3 mL (0.083 %) nebulizer solution Take 2.5 mg by inhalation.  MULTIVITAMINS WITH FLUORIDE (MULTI-VITAMIN PO) Take 1 Tab by mouth daily.  CALCIUM PO Take 500 mg by mouth daily.  CYANOCOBALAMIN, VITAMIN B-12, (VITAMIN B-12 PO) Take  by mouth daily.  metoprolol succinate (TOPROL-XL) 50 mg XL tablet Take 1 Tab by mouth daily. (Patient taking differently: Take 50 mg by mouth nightly.) 30 Tab 5    ipratropium-albuterol (COMBIVENT RESPIMAT)  mcg/actuation inhaler Take 1 puff by inhalation as needed for Wheezing.  montelukast (SINGULAIR) 10 mg tablet Take 10 mg by mouth nightly.  aspirin delayed-release 81 mg tablet TAKE 1 TABLET BY MOUTH TWICE DAILY MEALS FOR DVT PROPHYLAXIS  0    naproxen (NAPROSYN) 500 mg tablet         Allergies   Allergen Reactions    Penicillin G Benzathin,Procain Hives     Social History   Substance Use Topics    Smoking status: Never Smoker    Smokeless tobacco: Never Used    Alcohol use No     Patient returns today for follow-up. I have not seen her for essential tremor. She comes after an absence. Last visit we had her on Topamax.   She comes today and she is not on any medication that I have prescribed for her tremor. Interestingly she notes that her tremor is markedly better. In the interim since I saw her last she has had a lot of orthopedic difficulty and infected hip had to have hip surgery. She also had knee surgery. She notes interestingly that after surgery she noticed that her tremor was markedly better. On review of her medications and then going back and looking at her previous medications in the system she is now on Toprol and I do not see where she was on Toprol previously we discussed that that may be the case. In any regard for the tremor she is quite happy and she is not having tremor of the head. She is very happy with how that is doing. Her daughter, he has been diagnosed with multiple sclerosis she had questions about that today. She is a patient of mine as well. She asked about whether she needs to be tested in terms of genetic basis for about we discussed that there is no genetic basis for multiple sclerosis as far as we know at this point. She does have a new complaint today and that is of headache. She wonders if it is secondary to the nocturnal oxygen that she wears what she has been using that for 1 years. She notes that she has started to have this headache that is uncharacteristic for her. She is not a headache lady. She describes a holoacranial headache that settles in the vertex region. She notes that it occurs on awakening and tends to stay throughout the day. It never dissipates. Is constant. It is described as throbbing. She has not had any injury. She has not had any inciting factor. No other medication changes. No fever. No chills. No sweats. No palpitations chest pain. She notes her vision is changed and she relates that over the past few weeks she feels as though she needs new glasses and she is unable to see properly with her prescription. No visual field cut. She has not had any lifestyle changes. No changes to diet.     Review of systems  Pertinent positives and negatives are as noted above otherwise comprehensive systems review is negative    Examination  Visit Vitals    /90    Pulse 63    Resp 18    Ht 5' 2\" (1.575 m)    Wt 87.1 kg (192 lb)    SpO2 99%    BMI 35.12 kg/m2     Pleasant, well appearing lady with appropriate dress grooming and affect. No icterus. Oropharynx clear. Supple neck without bruit. Heart regular. No murmur is appreciated and she has symmetric pulses. No edema. Neurologically she is awake alert oriented and conversant. She has normal speech and language. Her cognition is normal.  She has reactive pupils. Disc margins are not well seen. Full versions without nystagmus. Face symmetrical.  Tongue and palate midline. No pronation or drift. No head to intubation. No cogwheeling. No tremor of hands. She resists fully in the upper and lower extremities in all muscle groups to direct testing. Symmetrical but depressed reflexes globally. No pathologic reflexes. No ataxia. Gait is steady. Impression/Plan  Very nice lady whose essential tremor actually looks quite well today and we discussed the fact that I cannot relate this to the surgery she had worsening infection she had and that I do not think the infection was the causative agent for her tremor but what I think likely has happened is that her blood pressure medication was changed from the lisinopril she was off to the Toprol and we discussed all beta blockers are used tremor and I think that is what is happening here is that the change from lisinopril to Toprol is actually had a positive effect on the tremor. She is happy about that. I would make no changes to that certainly at this point. More concerning today is the fact that we have new onset headache in a 61-year-old lady and I do not think this is related to her nocturnal oxygen I do not think this is related to her sinuses or other issue.   Again concern for new onset headache and the lady is not considered to be a headache person also associated change in her vision I think we need to be prudent about this and go ahead and get an MRI of the brain. Also get EEG and carotid Doppler looking for other ominous etiologies infectious etiology,. No medications given today as we explore potential causes for such. She does have claustrophobia and we therefore will use the open scanner. She will follow at the conclusion of her testing. This note was created using voice recognition software. Despite editing, there may be syntax errors. This note will not be viewable in [address]

## 2018-01-31 ENCOUNTER — OFFICE VISIT (OUTPATIENT)
Dept: NEUROLOGY | Age: 63
End: 2018-01-31

## 2018-01-31 DIAGNOSIS — H53.19 VISUAL DISTORTION: Primary | ICD-10-CM

## 2018-01-31 DIAGNOSIS — R51.9 NEW ONSET OF HEADACHES AFTER AGE 50: ICD-10-CM

## 2018-01-31 DIAGNOSIS — R29.818 TRANSIENT NEUROLOGICAL SYMPTOMS: ICD-10-CM

## 2018-02-01 NOTE — PROCEDURES
EEG:      Date:  01/31/2018    Requesting Physician:  Akbar Corbin. MD Broderick    An EEG is requested in this 63 y.o. with transient neurologic symptoms to evaluate for epileptiform abnormality. Medications:  Medications are listed as including Desyrel, Buspar, Wellbutrin, Singulair, Toprol and naproxen. This tracing is obtained during the awake state. During wakefulness, there are intermittent runs of posteriorly-dominant and symmetrical, low-to-medium amplitude, 11 cycle per second activities which attenuate with eye opening. Lower-voltage, faster-frequency activities are seen symmetrically over the anterior head regions. Hyperventilation was not performed secondary to medical history. Intermittent photic stimulation induces symmetric posterior driving responses. Sleep is not attained. Interpretation: This EEG recorded during the awake state is normal.  No epileptiform abnormalities are seen.

## 2018-02-01 NOTE — PROCEDURES
Carotid Doppler:     Requesting Physician:  Shad Partida MD     Indication:  Visual disturbance, evaluate for stenosis. B-mode imaging reveals minimal plaque at the bifurcations bilaterally. Doppler spectral analysis reveals no elevated velocities. Vertebral artery flow antegrade bilaterally. Interpretation:  Normal study.

## 2018-02-07 ENCOUNTER — HOSPITAL ENCOUNTER (OUTPATIENT)
Dept: MRI IMAGING | Age: 63
Discharge: HOME | End: 2018-02-07
Attending: PSYCHIATRY & NEUROLOGY
Payer: COMMERCIAL

## 2018-02-07 DIAGNOSIS — R51.9 NEW ONSET OF HEADACHES AFTER AGE 50: ICD-10-CM

## 2018-02-07 DIAGNOSIS — H53.19 VISUAL DISTORTION: ICD-10-CM

## 2018-02-07 LAB — CREAT BLD-MCNC: 1 MG/DL (ref 0.6–1.3)

## 2018-02-07 PROCEDURE — 74011250636 HC RX REV CODE- 250/636: Performed by: PSYCHIATRY & NEUROLOGY

## 2018-02-07 PROCEDURE — 82565 ASSAY OF CREATININE: CPT

## 2018-02-07 PROCEDURE — A9576 INJ PROHANCE MULTIPACK: HCPCS | Performed by: PSYCHIATRY & NEUROLOGY

## 2018-02-07 PROCEDURE — 70553 MRI BRAIN STEM W/O & W/DYE: CPT

## 2018-02-07 RX ADMIN — GADOTERIDOL 17 ML: 279.3 INJECTION, SOLUTION INTRAVENOUS at 12:07

## 2018-02-27 ENCOUNTER — OFFICE VISIT (OUTPATIENT)
Dept: NEUROLOGY | Age: 63
End: 2018-02-27

## 2018-02-27 VITALS
HEART RATE: 63 BPM | SYSTOLIC BLOOD PRESSURE: 128 MMHG | WEIGHT: 185 LBS | DIASTOLIC BLOOD PRESSURE: 88 MMHG | OXYGEN SATURATION: 98 % | RESPIRATION RATE: 17 BRPM | BODY MASS INDEX: 34.04 KG/M2 | HEIGHT: 62 IN

## 2018-02-27 DIAGNOSIS — R51.9 NEW ONSET OF HEADACHES AFTER AGE 50: Primary | ICD-10-CM

## 2018-02-27 NOTE — PROGRESS NOTES
I have reviewed the documentation provided by the nurse practitioner, Ms. Evelia Calvin, and we have discussed her findings and the clinical impression. I have formulated with her the proposed management plans for this patient. Additionally,  I have personally evaluated the patient to verify the history and to confirm physical findings. Below are my additional comments:  Patient returns today accompanied by her . She comes for follow-up of her testing. She notes her headaches are doing much better. There was less frequent. They are tolerable. Not bothersome. Her tests are reviewed personally including MRI and carotid Doppler and EEG. All those were normal.  She has not had any focal symptomology. Happy with how she is doing at this point. We discussed her normal test results. Discussed that her headaches right now are tolerable and given that we will make no further intervention. She is happy with that. We will take a wait and see approach. She will call should her headaches become intolerable.     Molly Ohara MD    Total time: 15 min   Counseling / coordination time: 15 min   > 50% counseling / coordination?: Yes re: as documented above

## 2018-02-27 NOTE — PROGRESS NOTES
Date:  18     Name:  Soniya Mederos  :  1955  MRN:  [id number]     PCP:  Sabine Pinon MD    Chief Complaint   Patient presents with    Results     follow up       HISTORY OF PRESENT ILLNESS:Follow up for headaches and essential tremors. She notes that her headache has decreased in intensity and the frequency is about the same. Her tests are reviewed personally including MRI and carotid Doppler and EEG. She is happy.   Tremor as also doing well. Tremor is being managed by toprol. No dizziness. No concerns   E  Current Outpatient Prescriptions   Medication Sig    diclofenac EC (VOLTAREN) 75 mg EC tablet TK 1 T PO BID WF    traZODone (DESYREL) 100 mg tablet TAKE 1 TABLET BY MOUTH AT BEDTIME AS NEEDED FOR INSOMNIA  Indications: insomnia associated with depression, insomnia    albuterol (PROVENTIL VENTOLIN) 2.5 mg /3 mL (0.083 %) nebulizer solution Take 2.5 mg by inhalation.  MULTIVITAMINS WITH FLUORIDE (MULTI-VITAMIN PO) Take 1 Tab by mouth daily.  CALCIUM PO Take 500 mg by mouth daily.  CYANOCOBALAMIN, VITAMIN B-12, (VITAMIN B-12 PO) Take  by mouth daily.  metoprolol succinate (TOPROL-XL) 50 mg XL tablet Take 1 Tab by mouth daily. (Patient taking differently: Take 50 mg by mouth nightly.)    ipratropium-albuterol (COMBIVENT RESPIMAT)  mcg/actuation inhaler Take 1 puff by inhalation as needed for Wheezing.  montelukast (SINGULAIR) 10 mg tablet Take 10 mg by mouth nightly.  aspirin delayed-release 81 mg tablet TAKE 1 TABLET BY MOUTH TWICE DAILY MEALS FOR DVT PROPHYLAXIS    busPIRone (BUSPAR) 15 mg tablet Take 1 Tab by mouth two (2) times a day.  buPROPion SR (WELLBUTRIN SR) 100 mg SR tablet Take 1 Tab by mouth daily. Indications: ANXIETY WITH DEPRESSION    naproxen (NAPROSYN) 500 mg tablet      No current facility-administered medications for this visit.       Allergies   Allergen Reactions    Penicillin G Benzathin,Procain Hives     Past Medical History: Diagnosis Date    Anxiety     Arthritis     osteoarthritis    Asthma     Chronic pain     bilateral knees, lower back, neck    COPD [facility] obstructive [facility] disease) ([facility] [address] [facility]     Coronary-myocardial bridge 9/24/2017    Depression     Diabetes (HCC)     diet controlled    Fatigue     FH: mental illness     GERD (gastroesophageal reflux disease)     Hypertension     Ill-defined condition     essential tremor    Morbid obesity (HCC)     Muscle pain     Ringing in ears     Shingles 2/2016    Sleep apnea     Sleep apnea     no CPAP  2 liters 02 via nc hs    Snoring     SOB (shortness of breath)      Past Surgical History:   Procedure Laterality Date    HX GASTRECTOMY  7/13/16    sleeve by Dr. Forrest Limdavid  7/13/16    Hiatal by     HX HYSTERECTOMY  1990    HX ORTHOPAEDIC  2009    R total hip repleacement    HX ORTHOPAEDIC  5/2015    repair of left knee    HX ORTHOPAEDIC  04/17/2017    revision of rt hip surgery     Social History     Social History    Marital status:      Spouse name: N/A    Number of children: 3    Years of education: N/A     Occupational History    unemployed       Social History Main Topics    Smoking status: Never Smoker    Smokeless tobacco: Never Used    Alcohol use No    Drug use: No    Sexual activity: Not on file     Other Topics Concern    Not on file     Social History Narrative    In the home with spouse and adult son (both independent)     Family History   Problem Relation Age of Onset    HIV/AIDS Mother     Asthma Father     Asthma Brother     Diabetes Brother     Asthma Brother     Cancer Paternal Grandmother     Anesth Problems Neg Hx        PHYSICAL EXAMINATION:    Visit Vitals    /88    Pulse 63    Resp 17    Ht 5' 2\" (1.575 m)    Wt 185 lb (83.9 kg)    SpO2 98%    BMI 33.84 kg/m2     General: Well defined, nourished, and groomed individual in no acute distress.    Neck: Supple, nontender, no bruits, no pain with resistance to active range of motion. Heart: Regular rate and rhythm, no murmurs, rub, or gallop. Normal S1S2. Lungs: Clear to auscultation bilaterally with equal chest expansion, no cough, no wheeze  Musculoskeletal: Extremities revealed no edema and had full range of motion of joints. Psych: Good mood and bright affect      NEUROLOGICAL EXAMINATION:   Mental Status: Alert and oriented to person, place, and time       Cranial Nerves:   II, III, IV, VI: Visual acuity grossly intact. Visual fields are normal.   Pupils are equal, round, and reactive to light and accommodation. Extra-ocular movements are full and fluid. Fundoscopic exam was benign, no ptosis or nystagmus. V-XII: Hearing is grossly intact. Facial features are symmetric, with normal sensation and strength. The palate rises symmetrically and the tongue protrudes midline. Sternocleidomastoids 5/5.       Motor Examination: Normal tone, bulk, and strength, 5/5 muscle strength throughout.       Coordination: No resting or intention tremor      Gait and Station: Steady while walking. Normal arm swing. No pronator drift. No muscle wasting or fasiculations noted.       Reflexes: DTRs 2+ throughout. ASSESSMENT AND PLAN   We discussed her test results which were normal.  Discussed that her headaches are doing well there is no need to make any adjustment to her medication. She is happy with the planFollow up as needed    Ganesh Colón NP    I have reviewed the documentation provided by the nurse practitioner, Ms. Vimaldavid Mishra, and we have discussed her findings and the clinical impression. I have formulated with her the proposed management plans for this patient. Additionally,  I have personally evaluated the patient to verify the history and to confirm physical findings. Below are my additional comments:  Patient returns today accompanied by her . She comes for follow-up of her testing.   She notes her headaches are doing much better. There was less frequent. They are tolerable. Not bothersome. Her tests are reviewed personally including MRI and carotid Doppler and EEG. All those were normal.  She has not had any focal symptomology. Happy with how she is doing at this point. We discussed her normal test results. Discussed that her headaches right now are tolerable and given that we will make no further intervention. She is happy with that. We will take a wait and see approach. She will call should her headaches become intolerable. Yossi Curiel MD    Total time: 15 min   Counseling / coordination time: 15 min   > 50% counseling / coordination?: Yes re: as documented above  This note will not be viewable in [address]

## 2018-02-27 NOTE — MR AVS SNAPSHOT
[address] [address] [phone] Patient: Monroe Burton MRN: [id number] Visit Information Date & Time Provider Department Dept. Phone Encounter #  
 2/27/2018  2:20 PM Yanique Shoemaker MD [facility] [phone] [id number] Follow-up Instructions Return if symptoms worsen or fail to improve. Your Appointments 3/20/2018 11:40 AM  
ESTABLISHED PATIENT with Eric Avelar MD  
[facility] ([facility] SCHEDULING) Appt Note: 6 mo fup  
 [address] [facility] [address]  
[address] [facility] [address] [address] Upcoming Health Maintenance Date Due Hepatitis C Screening 1955 Pneumococcal 19-64 Medium Risk (1 of 1 - PPSV23) 7/31/1976 DTaP/Tdap/Td series (1 - Tdap) 7/31/1978 PAP AKA CERVICAL CYTOLOGY 7/31/1978 BREAST CANCER SCRN MAMMOGRAM 7/31/2007 FOBT Q 1 YEAR AGE 50-75 7/31/2007 ZOSTER VACCINE AGE 60> 5/31/2017 Influenza Age 5 to Adult 8/1/2017 Allergies as of 2/27/2018  Review Complete On: 2/27/2018 By: Yanique Shoemaker MD  
  
 Severity Noted Reaction Type Reactions Penicillin G Benzathin,procain  01/24/2013    Hives Current Immunizations  Never Reviewed No immunizations on file. Not reviewed this visit Vitals BP Pulse Resp Height(growth percentile) Weight(growth percentile) SpO2  
 128/88 63 17 5' 2\" (1.575 m) 185 lb (83.9 kg) 98% BMI OB Status Smoking Status 33.84 kg/m2 Hysterectomy Never Smoker Vitals History BMI and BSA Data Body Mass Index Body Surface Area  
 33.84 kg/m 2 1.92 m 2 Preferred Pharmacy Pharmacy Name Phone [facility] [address], [address] [facility] [phone] Your Updated Medication List  
  
   
This list is accurate as of 2/27/18  2:43 PM.  Always use your most recent med list.  
  
  
  
  
 albuterol 2.5 mg /3 mL (0.083 %) nebulizer solution Commonly known as:  PROVENTIL VENTOLIN Take 2.5 mg by inhalation. aspirin delayed-release 81 mg tablet TAKE 1 TABLET BY MOUTH TWICE DAILY MEALS FOR DVT PROPHYLAXIS  
  
 buPROPion  mg SR tablet Commonly known as:  [place] Take 1 Tab by mouth daily. Indications: ANXIETY WITH DEPRESSION  
  
 busPIRone 15 mg tablet Commonly known as:  BUSPAR Take 1 Tab by mouth two (2) times a day. CALCIUM PO Take 500 mg by mouth daily. COMBIVENT RESPIMAT  mcg/actuation inhaler Generic drug:  ipratropium-albuterol Take 1 puff by inhalation as needed for Wheezing. diclofenac EC 75 mg EC tablet Commonly known as:  VOLTAREN  
TK 1 T PO BID WF  
  
 metoprolol succinate 50 mg XL tablet Commonly known as:  TOPROL-XL Take 1 Tab by mouth daily. montelukast 10 mg tablet Commonly known as:  SINGULAIR Take 10 mg by mouth nightly. MULTI-VITAMIN PO Take 1 Tab by mouth daily. naproxen 500 mg tablet Commonly known as:  NAPROSYN  
  
 traZODone 100 mg tablet Commonly known as:  DESYREL  
TAKE 1 TABLET BY MOUTH AT BEDTIME AS NEEDED FOR INSOMNIA  Indications: insomnia associated with depression, insomnia VITAMIN B-12 PO Take  by mouth daily. Follow-up Instructions Return if symptoms worsen or fail to improve. Introducing [facility]! Dear [facility]: 
Thank you for requesting a [employer] account. Our records indicate that you already have an active [employer] account. You can access your account anytime at [email or web address] Did you know that you can access your hospital and ER discharge instructions at any time in [employer]? You can also review all of your test results from your hospital stay or ER visit. Additional Information If you have questions, please visit the Frequently Asked Questions section of the [employer] website at [email or web address] Remember, [employer] is NOT to be used for urgent needs. For medical emergencies, dial 911. Now available from your iPhone and Android! Please provide this summary of care documentation to your next provider. Your primary care clinician is listed as Jalil Bergman. If you have any questions after today's visit, please call [phone].

## 2018-05-14 ENCOUNTER — TELEPHONE (OUTPATIENT)
Dept: SURGERY | Age: 63
End: 2018-05-14

## 2018-09-24 ENCOUNTER — APPOINTMENT (OUTPATIENT)
Dept: CT IMAGING | Age: 63
End: 2018-09-24
Attending: EMERGENCY MEDICINE
Payer: COMMERCIAL

## 2018-09-24 ENCOUNTER — HOSPITAL ENCOUNTER (EMERGENCY)
Age: 63
Discharge: HOME | End: 2018-09-24
Attending: EMERGENCY MEDICINE
Payer: COMMERCIAL

## 2018-09-24 VITALS
TEMPERATURE: 99.1 F | RESPIRATION RATE: 15 BRPM | SYSTOLIC BLOOD PRESSURE: 127 MMHG | DIASTOLIC BLOOD PRESSURE: 58 MMHG | WEIGHT: 185 LBS | BODY MASS INDEX: 34.04 KG/M2 | OXYGEN SATURATION: 100 % | HEART RATE: 97 BPM | HEIGHT: 62 IN

## 2018-09-24 DIAGNOSIS — R19.7 NAUSEA, VOMITING, AND DIARRHEA: Primary | ICD-10-CM

## 2018-09-24 DIAGNOSIS — R11.2 NAUSEA, VOMITING, AND DIARRHEA: Primary | ICD-10-CM

## 2018-09-24 LAB
ALBUMIN SERPL-MCNC: 4 G/DL (ref 3.5–5)
ALBUMIN/GLOB SERPL: 1 {RATIO} (ref 1.1–2.2)
ALP SERPL-CCNC: 122 U/L (ref 45–117)
ALT SERPL-CCNC: 30 U/L (ref 12–78)
ANION GAP SERPL CALC-SCNC: 7 MMOL/L (ref 5–15)
AST SERPL-CCNC: 30 U/L (ref 15–37)
BASOPHILS # BLD: 0 K/UL (ref 0–0.1)
BASOPHILS NFR BLD: 0 % (ref 0–1)
BILIRUB SERPL-MCNC: 0.8 MG/DL (ref 0.2–1)
BUN SERPL-MCNC: 20 MG/DL (ref 6–20)
BUN/CREAT SERPL: 20 (ref 12–20)
CALCIUM SERPL-MCNC: 8.7 MG/DL (ref 8.5–10.1)
CHLORIDE SERPL-SCNC: 104 MMOL/L (ref 97–108)
CO2 SERPL-SCNC: 28 MMOL/L (ref 21–32)
COMMENT, HOLDF: NORMAL
CREAT SERPL-MCNC: 1.02 MG/DL (ref 0.55–1.02)
DIFFERENTIAL METHOD BLD: ABNORMAL
EOSINOPHIL # BLD: 0 K/UL (ref 0–0.4)
EOSINOPHIL NFR BLD: 0 % (ref 0–7)
ERYTHROCYTE [DISTWIDTH] IN BLOOD BY AUTOMATED COUNT: 12.6 % (ref 11.5–14.5)
GLOBULIN SER CALC-MCNC: 4 G/DL (ref 2–4)
GLUCOSE SERPL-MCNC: 137 MG/DL (ref 65–100)
HCT VFR BLD AUTO: 45.8 % (ref 35–47)
HGB BLD-MCNC: 15 G/DL (ref 11.5–16)
IMM GRANULOCYTES # BLD: 0 K/UL (ref 0–0.04)
IMM GRANULOCYTES NFR BLD AUTO: 0 % (ref 0–0.5)
LYMPHOCYTES # BLD: 0.7 K/UL (ref 0.8–3.5)
LYMPHOCYTES NFR BLD: 4 % (ref 12–49)
MCH RBC QN AUTO: 32.5 PG (ref 26–34)
MCHC RBC AUTO-ENTMCNC: 32.8 G/DL (ref 30–36.5)
MCV RBC AUTO: 99.3 FL (ref 80–99)
MONOCYTES # BLD: 1.5 K/UL (ref 0–1)
MONOCYTES NFR BLD: 9 % (ref 5–13)
NEUTS SEG # BLD: 14.5 K/UL (ref 1.8–8)
NEUTS SEG NFR BLD: 87 % (ref 32–75)
NRBC # BLD: 0 K/UL (ref 0–0.01)
NRBC BLD-RTO: 0 PER 100 WBC
PLATELET # BLD AUTO: 203 K/UL (ref 150–400)
PMV BLD AUTO: 9.5 FL (ref 8.9–12.9)
POTASSIUM SERPL-SCNC: 3.7 MMOL/L (ref 3.5–5.1)
PROT SERPL-MCNC: 8 G/DL (ref 6.4–8.2)
RBC # BLD AUTO: 4.61 M/UL (ref 3.8–5.2)
RBC MORPH BLD: ABNORMAL
SAMPLES BEING HELD,HOLD: NORMAL
SODIUM SERPL-SCNC: 139 MMOL/L (ref 136–145)
WBC # BLD AUTO: 16.7 K/UL (ref 3.6–11)

## 2018-09-24 PROCEDURE — 74177 CT ABD & PELVIS W/CONTRAST: CPT

## 2018-09-24 PROCEDURE — 74011636320 HC RX REV CODE- 636/320: Performed by: EMERGENCY MEDICINE

## 2018-09-24 PROCEDURE — 85025 COMPLETE CBC W/AUTO DIFF WBC: CPT | Performed by: EMERGENCY MEDICINE

## 2018-09-24 PROCEDURE — 80053 COMPREHEN METABOLIC PANEL: CPT | Performed by: EMERGENCY MEDICINE

## 2018-09-24 PROCEDURE — 74011250636 HC RX REV CODE- 250/636: Performed by: EMERGENCY MEDICINE

## 2018-09-24 PROCEDURE — 96374 THER/PROPH/DIAG INJ IV PUSH: CPT

## 2018-09-24 PROCEDURE — 36415 COLL VENOUS BLD VENIPUNCTURE: CPT | Performed by: EMERGENCY MEDICINE

## 2018-09-24 PROCEDURE — 96361 HYDRATE IV INFUSION ADD-ON: CPT

## 2018-09-24 PROCEDURE — 74011000258 HC RX REV CODE- 258: Performed by: EMERGENCY MEDICINE

## 2018-09-24 PROCEDURE — 99284 EMERGENCY DEPT VISIT MOD MDM: CPT

## 2018-09-24 RX ORDER — SODIUM CHLORIDE 0.9 % (FLUSH) 0.9 %
10 SYRINGE (ML) INJECTION
Status: COMPLETED | OUTPATIENT
Start: 2018-09-24 | End: 2018-09-24

## 2018-09-24 RX ORDER — ONDANSETRON 2 MG/ML
4 INJECTION INTRAMUSCULAR; INTRAVENOUS
Status: COMPLETED | OUTPATIENT
Start: 2018-09-24 | End: 2018-09-24

## 2018-09-24 RX ORDER — ONDANSETRON 4 MG/1
4 TABLET, ORALLY DISINTEGRATING ORAL
Qty: 15 TAB | Refills: 0 | Status: SHIPPED | OUTPATIENT
Start: 2018-09-24 | End: 2021-12-09

## 2018-09-24 RX ADMIN — Medication 10 ML: at 19:25

## 2018-09-24 RX ADMIN — SODIUM CHLORIDE 1000 ML: 900 INJECTION, SOLUTION INTRAVENOUS at 19:47

## 2018-09-24 RX ADMIN — SODIUM CHLORIDE 100 ML: 900 INJECTION, SOLUTION INTRAVENOUS at 19:25

## 2018-09-24 RX ADMIN — SODIUM CHLORIDE 1000 ML: 900 INJECTION, SOLUTION INTRAVENOUS at 18:44

## 2018-09-24 RX ADMIN — IOPAMIDOL 100 ML: 755 INJECTION, SOLUTION INTRAVENOUS at 19:25

## 2018-09-24 RX ADMIN — ONDANSETRON 4 MG: 2 INJECTION INTRAMUSCULAR; INTRAVENOUS at 18:44

## 2018-09-25 NOTE — ED NOTES
Discharge instructions and written script given to patient by provider with opportunity to ask questions. Pt verbalizing understanding. VSS. NAD. Pt left ED via wheelchair accompanied by staff.

## 2018-09-25 NOTE — ED PROVIDER NOTES
HPI Comments: 66 y.o. female with past medical history significant for Asthma, Hypertension, Anxiety, Depression, Morbid Obesity, COPD, GERD, and Diabetes who presents from home with chief complaint of vomiting. Patient reports last night eating crabs for dinner and states onset this morning of nausea and vomiting (10+ episodes) described as \"pink\" which has persisted all day. Patient reports accompanying abdominal pain, generalized weakness, chills, diarrhea (10+ episodes), and feeling like she \"is going to pass out. \" Patient reports her last episode of vomiting and diarrhea were both 2 hours prior to arrival. Patient notes her  ate the same crabs last night and has also been ill today. Patient reports history of hip revision, total knee replacement, hysterectomy, and gastrectomy. Pt denies fever, cough, congestion, shortness of breath, chest pain, difficulty with urination or dysuria. There are no other acute medical concerns at this time. PCP: Karsten Infante MD 
 
Note written by Gwen Dawn, as dictated by Tonia Luong MD 6:42 PM  
 
 
The history is provided by the patient. Past Medical History:  
Diagnosis Date  Anxiety  Arthritis   
 osteoarthritis  Asthma  Chronic pain   
 bilateral knees, lower back, neck  COPD (chronic obstructive pulmonary disease) (HCC)  Coronary-myocardial bridge 9/24/2017  Depression  Diabetes ([address] diet controlled  Fatigue  FH: mental illness  GERD (gastroesophageal reflux disease)  Hypertension  Ill-defined condition   
 essential tremor  Morbid obesity ([address]  Muscle pain  Ringing in ears  Shingles 2/2016  Sleep apnea  Sleep apnea   
 no CPAP  2 liters 02 via nc hs  Snoring  SOB (shortness of breath) Past Surgical History:  
Procedure Laterality Date  HX GASTRECTOMY  7/13/16  
 sleeve by Dr. Arvind Velasquez  HX HERNIA REPAIR  7/13/16 Hiatal by [facility] [address] [facility] HX ORTHOPAEDIC  2009 R total hip repleacement  HX ORTHOPAEDIC  5/2015  
 repair of left knee  HX ORTHOPAEDIC  04/17/2017  
 revision of rt hip surgery Family History:  
Problem Relation Age of Onset  HIV/AIDS Mother  Asthma Father  Asthma Brother  Diabetes Brother  Asthma Brother  Cancer Paternal Grandmother  Anesth Problems Neg Hx Social History Social History  Marital status:  Spouse name: N/A  
 Number of children: 3  
 Years of education: N/A Occupational History  unemployed Social History Main Topics  Smoking status: Never Smoker  Smokeless tobacco: Never Used  Alcohol use No  
 Drug use: No  
 Sexual activity: Not on file Other Topics Concern  Not on file Social History Narrative In the home with spouse and adult son (both independent) ALLERGIES: Penicillin g benzathin,procain Review of Systems Constitutional: Positive for chills. Negative for fever. HENT: Negative for congestion. Respiratory: Negative for cough and shortness of breath. Cardiovascular: Negative for chest pain. Gastrointestinal: Positive for abdominal pain, diarrhea, nausea and vomiting. Negative for constipation. Genitourinary: Negative for difficulty urinating and dyspareunia. Neurological: Positive for weakness. Negative for dizziness and light-headedness. All other systems reviewed and are negative. Vitals:  
 09/24/18 1818 09/24/18 1900 09/24/18 1946 BP: 153/76 141/65 142/60 Pulse: (!) 108 95 97 Resp: 16 17 Temp: 99.3 °F (37.4 °C)  100 °F (37.8 °C) SpO2: 97% 98% 98% Weight: 83.9 kg (185 lb) Height: 5' 2\" (1.575 m) Physical Exam  
Constitutional: She is oriented to person, place, and time. She appears well-developed. No distress. HENT:  
Head: Normocephalic and atraumatic. Eyes: Pupils are equal, round, and reactive to light. No scleral icterus. Neck: Normal range of motion. Neck supple. Cardiovascular: Regular rhythm. Tachycardia present. Pulmonary/Chest: Effort normal and breath sounds normal.  
Abdominal: Soft. She exhibits no distension. There is no tenderness. There is no rebound and no guarding. Musculoskeletal: Normal range of motion. Neurological: She is alert and oriented to person, place, and time. Skin: Skin is warm and dry. She is not diaphoretic. Psychiatric: She has a normal mood and affect. Her behavior is normal. Thought content normal.  
Nursing note and vitals reviewed. Note written by Gwen Nix, as dictated by Laron Kessler MD 6:42 PM 
  
 
MDM Number of Diagnoses or Management Options Nausea, vomiting, and diarrhea: new and requires workup Diagnosis management comments: The patient is resting comfortably and feels better, is alert and in no distress. The repeat examination is unremarkable and benign; in particular, there is no discomfort at McBurney's point. The history, exam, diagnostic testing, and current condition do not suggest acute appendicitis, bowel obstruction, incarcerated hernia, acute cholecystitis, bowel perforation, major gastrointestinal bleeding, severe diverticulitis, sepsis, or other significant pathology to warrant further testing, continued ED treatment, admission, or surgical evaluation at this point. The vital signs have been stable and are within normal limits at this time. The patient does not have uncontrollable pain, intractable vomiting, or other significant symptoms. The patient's condition is stable and appropriate for discharge. The patient will pursue further outpatient evaluation with the primary care physician or other designated or consulting physician as indicated in the discharge instructions. ED Course Procedures PROGRESS NOTE: 
 8:14 PM Patient is feeling a little better been and has been 3 hours since last episode of vomiting or diarrhea. Patient getting appetite back and feeling thirsty. Provider reviewed results and findings with patient. Patient expressed understanding. The patient's results have been reviewed with them and/or available family. Patient and/or family verbally conveyed their understanding and agreement of the patient's signs, symptoms, diagnosis, treatment and prognosis and additionally agree to follow up as recommended in the discharge instructions or to return to the Emergency Room should their condition change prior to their follow-up appointment. The patient/family verbally agrees with the care-plan and verbally conveys that all of their questions have been answered. The discharge instructions have also been provided to the patient and/or family with some educational information regarding the patient's diagnosis as well a list of reasons why the patient would want to return to the ER prior to their follow-up appointment, should their condition change.

## 2019-05-14 ENCOUNTER — TELEPHONE (OUTPATIENT)
Dept: SURGERY | Age: 64
End: 2019-05-14

## 2020-05-14 ENCOUNTER — TELEPHONE (OUTPATIENT)
Dept: SURGERY | Age: 65
End: 2020-05-14

## 2021-03-25 ENCOUNTER — IMMUNIZATION (OUTPATIENT)
Dept: INTERNAL MEDICINE CLINIC | Age: 66
End: 2021-03-25
Payer: COMMERCIAL

## 2021-03-25 DIAGNOSIS — Z23 ENCOUNTER FOR IMMUNIZATION: Primary | ICD-10-CM

## 2021-03-25 PROCEDURE — 0001A COVID-19, MRNA, LNP-S, PF, 30MCG/0.3ML DOSE(PFIZER): CPT | Performed by: FAMILY MEDICINE

## 2021-03-25 PROCEDURE — 91300 COVID-19, MRNA, LNP-S, PF, 30MCG/0.3ML DOSE(PFIZER): CPT | Performed by: FAMILY MEDICINE

## 2021-04-15 ENCOUNTER — IMMUNIZATION (OUTPATIENT)
Dept: INTERNAL MEDICINE CLINIC | Age: 66
End: 2021-04-15
Payer: COMMERCIAL

## 2021-04-15 DIAGNOSIS — Z23 ENCOUNTER FOR IMMUNIZATION: Primary | ICD-10-CM

## 2021-04-15 PROCEDURE — 91300 COVID-19, MRNA, LNP-S, PF, 30MCG/0.3ML DOSE(PFIZER): CPT | Performed by: FAMILY MEDICINE

## 2021-04-15 PROCEDURE — 0002A COVID-19, MRNA, LNP-S, PF, 30MCG/0.3ML DOSE(PFIZER): CPT | Performed by: FAMILY MEDICINE

## 2021-08-23 ENCOUNTER — TRANSCRIBE ORDER (OUTPATIENT)
Dept: RESPIRATORY THERAPY | Age: 66
End: 2021-08-23

## 2021-08-23 DIAGNOSIS — J44.9 CHRONIC OBSTRUCTIVE PULMONARY DISEASE, UNSPECIFIED COPD TYPE (HCC): Primary | ICD-10-CM

## 2021-08-27 ENCOUNTER — HOSPITAL ENCOUNTER (OUTPATIENT)
Dept: PULMONOLOGY | Age: 66
Discharge: HOME | End: 2021-08-27
Payer: COMMERCIAL

## 2021-08-27 PROCEDURE — 94729 DIFFUSING CAPACITY: CPT

## 2021-08-27 PROCEDURE — 94726 PLETHYSMOGRAPHY LUNG VOLUMES: CPT

## 2021-08-27 PROCEDURE — 94375 RESPIRATORY FLOW VOLUME LOOP: CPT

## 2021-12-09 ENCOUNTER — OFFICE VISIT (OUTPATIENT)
Dept: CARDIOLOGY CLINIC | Age: 66
End: 2021-12-09
Payer: COMMERCIAL

## 2021-12-09 VITALS
DIASTOLIC BLOOD PRESSURE: 88 MMHG | WEIGHT: 202 LBS | HEIGHT: 62 IN | OXYGEN SATURATION: 97 % | SYSTOLIC BLOOD PRESSURE: 138 MMHG | BODY MASS INDEX: 37.17 KG/M2 | HEART RATE: 58 BPM

## 2021-12-09 DIAGNOSIS — R07.89 CHEST DISCOMFORT: ICD-10-CM

## 2021-12-09 DIAGNOSIS — I10 ESSENTIAL HYPERTENSION: Primary | ICD-10-CM

## 2021-12-09 PROCEDURE — 93000 ELECTROCARDIOGRAM COMPLETE: CPT | Performed by: SPECIALIST

## 2021-12-09 PROCEDURE — 99214 OFFICE O/P EST MOD 30 MIN: CPT | Performed by: SPECIALIST

## 2021-12-09 RX ORDER — SIMVASTATIN 10 MG/1
TABLET, FILM COATED ORAL
COMMUNITY
End: 2022-05-02 | Stop reason: DRUGHIGH

## 2021-12-09 NOTE — PROGRESS NOTES
Loki Chicas is a 61 y.o. female    There were no vitals taken for this visit. Chief Complaint   Patient presents with    Hypertension    Cholesterol Problem    Other     MYOCARDIAL BRIDGE IN LAD       Chest pain WEIRD SENSATIONS  SOB YES, COPD  Dizziness NO  Swelling NO  Recent hospital visit NO  Refills NO  COVID VACCINE STATUS YES  HAD COVID?  NO    O2 THERAPY AT NIGHT    DISCOLORED NAILS ON RIGHT HAND

## 2021-12-09 NOTE — PATIENT INSTRUCTIONS
1) we will order a Michaela Romel for the chest discomfort you are experiencing to ensure that you have good blood flow to your heart muscle    2) we will check echocardiogram which is an ultrasound of your heart to look at the structure of your heart    3) we will obtain your cholesterol profile from her primary care next     4) I would consider getting calcium scoring and will give you the information on that it is a test that you order and that you would pay for which is $99- $129 and tells me but your calcium lining of your blood vessels may be. This is not mandatory but is 1 piece of the puzzle that will help us understand whether or not you have a risk for heart disease. 5) follow-up with me in 8 weeks.

## 2021-12-09 NOTE — LETTER
12/9/2021    Patient: Early Glade   YOB: 1955   Date of Visit: 12/9/2021     Lizy Hanley MD  [address] [facility] [address]  Via Fax: [phone]    Dear Lizy Hanley MD,      Thank you for referring Ms. Barrett Seo to [facility] for evaluation. My notes for this consultation are attached. If you have questions, please do not hesitate to call me. I look forward to following your patient along with you.       Sincerely,    Fausto Quinonez MD

## 2021-12-09 NOTE — PROGRESS NOTES
LAST OFFICE VISIT : 8/20/2015        ICD-10-CM ICD-9-CM   1. Essential hypertension  I10 401.9            Watson Ceja is a 61 y.o. female with hypertension and hyperlipidemia referred for cardiac clearance. Cardiac risk factors: dyslipidemia, obesity, hypertension, post-menopausal.  I have personally obtained the history from the patient. HISTORY OF PRESENTING ILLNESS    she was last seen in our office in 2017. She has been having funny feeling in her chest and had sharp pins in chest. She has no further epsidodes. No heaviness and no OB. She has COPD on oxygen  At night. She was told she had some issue with her heart  in [place]. She tries to exercise and she will walk. She has some SOB with walking but feels it may be her COPD.she sees pulmonary. No nocturnal chest pain or SOB.       ACTIVE PROBLEM LIST     Patient Active Problem List    Diagnosis Date Noted    Coronary-myocardial bridge 09/24/2017    Weight gain, abnormal 08/08/2017    Abscess of right hip 11/08/2016    COPD (chronic obstructive pulmonary disease) ([facility] [address] 01/19/2016    HAWK (obstructive sleep apnea) 01/19/2016    Morbid obesity ([facility] [address] 05/19/2015    Insomnia 03/11/2015    Essential tremor 09/30/2014    Anxiety disorder 09/09/2013    Major depressive disorder, recurrent episode, severe ([address] 09/09/2013           PAST MEDICAL HISTORY     Past Medical History:   Diagnosis Date    Anxiety     Arthritis     osteoarthritis    Asthma     Chronic pain     bilateral knees, lower back, neck    COPD (chronic obstructive pulmonary disease) ([facility] [address]     Coronary-myocardial bridge 9/24/2017    Depression     Diabetes (HCC)     diet controlled    Fatigue     FH: mental illness     GERD (gastroesophageal reflux disease)     Hypertension     Ill-defined condition     essential tremor    Morbid obesity (HCC)     Muscle pain     Ringing in ears     Shingles 2/2016    Sleep apnea     Sleep apnea     no CPAP  2 liters 02 via nc hs    Snoring     SOB (shortness of breath)            PAST SURGICAL HISTORY     Past Surgical History:   Procedure Laterality Date    HX GASTRECTOMY  7/13/16    sleeve by Dr. Hawk Public  7/13/16    Hiatal by     HX HYSTERECTOMY  1990    HX ORTHOPAEDIC  2009    R total hip repleacement    HX ORTHOPAEDIC  5/2015    repair of left knee    HX ORTHOPAEDIC  04/17/2017    revision of rt hip surgery          ALLERGIES     Allergies   Allergen Reactions    Penicillin G Benzathin,Procain Hives          FAMILY HISTORY     Family History   Problem Relation Age of Onset    HIV/AIDS Mother     Asthma Father     Asthma Brother     Diabetes Brother     Asthma Brother     Cancer Paternal Grandmother     Anesth Problems Neg Hx     negative for cardiac disease       SOCIAL HISTORY     Social History     Socioeconomic History    Marital status:     Number of children: 3   Occupational History    Occupation: unemployed    Tobacco Use    Smoking status: Never Smoker    Smokeless tobacco: Never Used   Substance and Sexual Activity    Alcohol use: No    Drug use: No   Social History Narrative    In the home with spouse and adult son (both independent)         MEDICATIONS     Current Outpatient Medications   Medication Sig    simvastatin (ZOCOR) 10 mg tablet Take  by mouth nightly.  fluticasone propionate (FLONASE NA) by Nasal route.  fish oil-omega-3 fatty acids (Fish OiL) 340-1,000 mg capsule Take 1 Capsule by mouth daily.  traZODone (DESYREL) 100 mg tablet TAKE 1 TABLET BY MOUTH AT BEDTIME AS NEEDED FOR INSOMNIA  Indications: insomnia associated with depression, insomnia    MULTIVITAMINS WITH FLUORIDE (MULTI-VITAMIN PO) Take 1 Tab by mouth daily.  CALCIUM PO Take 500 mg by mouth daily. 1200MG DAILY    CYANOCOBALAMIN, VITAMIN B-12, (VITAMIN B-12 PO) Take  by mouth daily.  metoprolol succinate (TOPROL-XL) 50 mg XL tablet Take 1 Tab by mouth daily.  (Patient taking differently: Take 50 mg by mouth nightly.)    montelukast (SINGULAIR) 10 mg tablet Take 10 mg by mouth nightly.  ondansetron (ZOFRAN ODT) 4 mg disintegrating tablet Take 1 Tab by mouth every eight (8) hours as needed for Nausea. (Patient not taking: Reported on 12/9/2021)    diclofenac EC (VOLTAREN) 75 mg EC tablet TK 1 T PO BID WF (Patient not taking: Reported on 12/9/2021)    aspirin delayed-release 81 mg tablet TAKE 1 TABLET BY MOUTH TWICE DAILY MEALS FOR DVT PROPHYLAXIS (Patient not taking: Reported on 12/9/2021)    busPIRone (BUSPAR) 15 mg tablet Take 1 Tab by mouth two (2) times a day. (Patient not taking: Reported on 12/9/2021)    buPROPion SR (WELLBUTRIN SR) 100 mg SR tablet Take 1 Tab by mouth daily. Indications: ANXIETY WITH DEPRESSION (Patient not taking: Reported on 12/9/2021)    albuterol (PROVENTIL VENTOLIN) 2.5 mg /3 mL (0.083 %) nebulizer solution Take 2.5 mg by inhalation. (Patient not taking: Reported on 12/9/2021)    naproxen (NAPROSYN) 500 mg tablet  (Patient not taking: Reported on 12/9/2021)    ipratropium-albuterol (COMBIVENT RESPIMAT)  mcg/actuation inhaler Take 1 puff by inhalation as needed for Wheezing. No current facility-administered medications for this visit. I have reviewed the nurses notes, vitals, problem list, allergy list, medical history, family, social history and medications. REVIEW OF SYMPTOMS   Pertinent positive per HPI  General: Pt denies excessive weight gain or loss. Pt is able to conduct ADL's  HEENT: Denies blurred vision, headaches, hearing loss, epistaxis and difficulty swallowing. Respiratory: Denies cough, congestion, shortness of breath, GRANADOS, wheezing or stridor.   Cardiovascular: Denies precordial pain, palpitations, edema or PND  Gastrointestinal: Denies poor appetite, indigestion, abdominal pain or blood in stool  Genitourinary: Denies hematuria, dysuria, increased urinary frequency  Musculoskeletal: Denies joint pain or swelling from muscles or joints  Neurologic: Denies tremor, paresthesias, headache, or sensory motor disturbance  Psychiatric: Denies confusion, insomnia, depression  Integumentray: Denies rash, itching or ulcers. Hematologic: Denies easy bruising, bleeding     PHYSICAL EXAMINATION      Vitals:    12/09/21 1600   BP: 138/88   Pulse: (!) 58   SpO2: 97%   Weight: 202 lb (91.6 kg)   Height: 5' 2\" (1.575 m)     General: Well developed, in no acute distress. In wheelchair  HEENT: No jaundice, oral mucosa moist, no oral ulcers  Neck: Supple, no stiffness, no lymphadenopathy, supple  Heart:  Normal S1/S2 negative S3 or S4. Regular, no murmur, gallop or rub, no jugular venous distention  Respiratory: Clear bilaterally x 4, no wheezing or rales  Extremities:  No edema, normal cap refill, no cyanosis. Musculoskeletal: No clubbing, no deformities   Neuro: A&Ox3, speech clear, gait stable, cooperative, no focal neurologic deficits  Skin: Skin color is normal. No rashes or lesions. Non diaphoretic, moist.  Vascular: 2+ pulses symmetric in all extremities        EKG: Normal sinus rhythm nonspecific ST-T changes     DIAGNOSTIC DATA     1. Cardiac Cath  9/23/15-   1) LM long,large no dz  2) LAD moderate caliber course to the apex. There is some myocardial  bridging in the mid segment of the LAD. No disease. Diagonal without disease. DI high take off and no disease  3) Circumflex -mod -> small no disease  4) RCA is large dominant with no disease and the PDA and PL are free of  Disease    2. Echo  7/1/15- EF 65%    3.  Lexiscan  8/21/15- mod size, mod-severe grade, predominantly reversible defect involving the apex and distal inferior wall, mod extent of ischemia       LABORATORY DATA            Lab Results   Component Value Date/Time    WBC 16.7 (H) 09/24/2018 06:34 PM    HGB 15.0 09/24/2018 06:34 PM    HCT 45.8 09/24/2018 06:34 PM    PLATELET 677 13/07/3029 06:34 PM    MCV 99.3 (H) 09/24/2018 06:34 PM      Lab Results   Component Value Date/Time    Sodium 139 09/24/2018 06:34 PM    Potassium 3.7 09/24/2018 06:34 PM    Chloride 104 09/24/2018 06:34 PM    CO2 28 09/24/2018 06:34 PM    Anion gap 7 09/24/2018 06:34 PM    Glucose 137 (H) 09/24/2018 06:34 PM    BUN 20 09/24/2018 06:34 PM    Creatinine 1.02 09/24/2018 06:34 PM    BUN/Creatinine ratio 20 09/24/2018 06:34 PM    GFR est AA >60 09/24/2018 06:34 PM    GFR est non-AA 55 (L) 09/24/2018 06:34 PM    Calcium 8.7 09/24/2018 06:34 PM    Bilirubin, total 0.8 09/24/2018 06:34 PM    Alk. phosphatase 122 (H) 09/24/2018 06:34 PM    Protein, total 8.0 09/24/2018 06:34 PM    Albumin 4.0 09/24/2018 06:34 PM    Globulin 4.0 09/24/2018 06:34 PM    A-G Ratio 1.0 (L) 09/24/2018 06:34 PM    ALT (SGPT) 30 09/24/2018 06:34 PM           ASSESSMENT/RECOMMENDATIONS:.         1. Hypertension  -BP is borderline just reduce her sodium intake  -Recheck her blood pressure at the time of stress testing     3. dyslipidemia  - I do not have a recent cholesterol profile on her so we will obtain one from her primary care  -Gave her information on calcium scoring. 4. Had HAWK in the past and repeated and was ok.  -she lost weight and stopped using the CPAP and now has gained weight.  -tralked about improtance wearing CPAP and may need repeat study    5. Chest discomfort  -Suman Marquis going forward with a Lexiscan Cardiolite and echocardiogram     4. Return in 8 weeks    Orders Placed This Encounter    AMB POC EKG ROUTINE W/ 12 LEADS, INTER & REP     Order Specific Question:   Reason for Exam:     Answer:   HTN    simvastatin (ZOCOR) 10 mg tablet     Sig: Take  by mouth nightly.  fluticasone propionate (FLONASE NA)     Sig: by Nasal route.  fish oil-omega-3 fatty acids (Fish OiL) 340-1,000 mg capsule     Sig: Take 1 Capsule by mouth daily. Follow-up and Dispositions  ·   Return in about 6 months (around 6/9/2022).            I have discussed the diagnosis with  Emory Quintero and the intended plan as seen in the above orders. Questions were answered concerning future plans. I have discussed medication side effects and warnings with the patient as well. Thank you,  Vineet Tejada MD for involving me in the care of  Sri Wallace. Please do not hesitate to contact me for further questions/concerns. This note was written by estefany Garcia, as dictated by Reji Pop MD.      Gordon Suarez. MD [place], [facility] [address]      [facility], [address] [place] [address]      [phone] / [phone] Fax

## 2021-12-22 ENCOUNTER — ANCILLARY PROCEDURE (OUTPATIENT)
Dept: CARDIOLOGY CLINIC | Age: 66
End: 2021-12-22

## 2021-12-22 ENCOUNTER — ANCILLARY PROCEDURE (OUTPATIENT)
Dept: CARDIOLOGY CLINIC | Age: 66
End: 2021-12-22
Payer: COMMERCIAL

## 2021-12-22 VITALS
BODY MASS INDEX: 37.17 KG/M2 | HEIGHT: 62 IN | DIASTOLIC BLOOD PRESSURE: 86 MMHG | SYSTOLIC BLOOD PRESSURE: 132 MMHG | WEIGHT: 202 LBS

## 2021-12-22 VITALS — WEIGHT: 202 LBS | HEIGHT: 62 IN | BODY MASS INDEX: 37.17 KG/M2

## 2021-12-22 DIAGNOSIS — I10 ESSENTIAL HYPERTENSION: ICD-10-CM

## 2021-12-22 DIAGNOSIS — R07.89 CHEST DISCOMFORT: ICD-10-CM

## 2021-12-22 PROCEDURE — 93306 TTE W/DOPPLER COMPLETE: CPT | Performed by: SPECIALIST

## 2021-12-22 PROCEDURE — 78452 HT MUSCLE IMAGE SPECT MULT: CPT | Performed by: INTERNAL MEDICINE

## 2021-12-22 PROCEDURE — 93015 CV STRESS TEST SUPVJ I&R: CPT | Performed by: INTERNAL MEDICINE

## 2021-12-22 PROCEDURE — A9500 TC99M SESTAMIBI: HCPCS | Performed by: INTERNAL MEDICINE

## 2021-12-22 RX ORDER — TETRAKIS(2-METHOXYISOBUTYLISOCYANIDE)COPPER(I) TETRAFLUOROBORATE 1 MG/ML
25.9 INJECTION, POWDER, LYOPHILIZED, FOR SOLUTION INTRAVENOUS ONCE
Status: COMPLETED | OUTPATIENT
Start: 2021-12-22 | End: 2021-12-22

## 2021-12-22 RX ADMIN — TETRAKIS(2-METHOXYISOBUTYLISOCYANIDE)COPPER(I) TETRAFLUOROBORATE 25.9 MILLICURIE: 1 INJECTION, POWDER, LYOPHILIZED, FOR SOLUTION INTRAVENOUS at 09:10

## 2021-12-28 ENCOUNTER — APPOINTMENT (OUTPATIENT)
Dept: CARDIOLOGY CLINIC | Age: 66
End: 2021-12-28

## 2021-12-28 RX ORDER — TETRAKIS(2-METHOXYISOBUTYLISOCYANIDE)COPPER(I) TETRAFLUOROBORATE 1 MG/ML
24.6 INJECTION, POWDER, LYOPHILIZED, FOR SOLUTION INTRAVENOUS ONCE
Status: COMPLETED | OUTPATIENT
Start: 2021-12-28 | End: 2021-12-28

## 2021-12-28 RX ADMIN — TETRAKIS(2-METHOXYISOBUTYLISOCYANIDE)COPPER(I) TETRAFLUOROBORATE 24.6 MILLICURIE: 1 INJECTION, POWDER, LYOPHILIZED, FOR SOLUTION INTRAVENOUS at 08:41

## 2021-12-29 LAB
NUC STRESS EJECTION FRACTION: 58 %
STRESS BASELINE DIAS BP: 82 MMHG
STRESS BASELINE HR: 60 BPM
STRESS BASELINE SYS BP: 126 MMHG
STRESS ESTIMATED WORKLOAD: 1 METS
STRESS EXERCISE DUR MIN: NORMAL
STRESS O2 SAT PEAK: 99 %
STRESS O2 SAT REST: 98 %
STRESS PEAK DIAS BP: 92 MMHG
STRESS PEAK SYS BP: 162 MMHG
STRESS PERCENT HR ACHIEVED: 63 %
STRESS POST PEAK HR: 99 BPM
STRESS RATE PRESSURE PRODUCT: NORMAL BPM*MMHG
STRESS ST DEPRESSION: 0 MM
STRESS TARGET HR: 156 BPM

## 2022-01-01 LAB
ECHO AO ASC DIAM: 2.9 CM
ECHO AO ASCENDING AORTA INDEX: 1.51 CM/M2
ECHO AO ROOT DIAM: 3.1 CM
ECHO AO ROOT INDEX: 1.61 CM/M2
ECHO AV AREA PEAK VELOCITY: 3.1 CM2
ECHO AV AREA PEAK VELOCITY: 3.1 CM2
ECHO AV AREA VTI: 2.8 CM2
ECHO AV AREA VTI: 2.8 CM2
ECHO AV MEAN GRADIENT: 3 MMHG
ECHO AV MEAN VELOCITY: 0.8 M/S
ECHO AV PEAK GRADIENT: 6 MMHG
ECHO AV PEAK VELOCITY: 1.2 M/S
ECHO AV VELOCITY RATIO: 1
ECHO AV VTI: 26.8 CM
ECHO EST RA PRESSURE: 3 MMHG
ECHO LA DIAMETER INDEX: 1.77 CM/M2
ECHO LA DIAMETER: 3.4 CM
ECHO LA TO AORTIC ROOT RATIO: 1.1
ECHO LA VOL 2C: 50 ML (ref 22–52)
ECHO LA VOL 4C: 41 ML (ref 22–52)
ECHO LA VOL BP: 46 ML (ref 22–52)
ECHO LA VOL BP: 46 ML (ref 22–52)
ECHO LA VOLUME AREA LENGTH: 50 ML
ECHO LA VOLUME INDEX A2C: 26 ML/M2 (ref 16–34)
ECHO LA VOLUME INDEX A4C: 21 ML/M2 (ref 16–34)
ECHO LA VOLUME INDEX AREA LENGTH: 26 ML/M2 (ref 16–34)
ECHO LV E' LATERAL VELOCITY: 8 CM/S
ECHO LV E' SEPTAL VELOCITY: 8 CM/S
ECHO LV EDV A2C: 102 ML
ECHO LV EDV A4C: 97 ML
ECHO LV EDV BP: 104 ML (ref 56–104)
ECHO LV EDV INDEX A4C: 51 ML/M2
ECHO LV EDV INDEX BP: 54 ML/M2
ECHO LV EDV NDEX A2C: 53 ML/M2
ECHO LV EJECTION FRACTION A2C: 64 %
ECHO LV EJECTION FRACTION A4C: 60 %
ECHO LV EJECTION FRACTION BIPLANE: 63 % (ref 55–100)
ECHO LV ESV A2C: 37 ML
ECHO LV ESV A4C: 39 ML
ECHO LV ESV BP: 39 ML (ref 19–49)
ECHO LV ESV INDEX A2C: 19 ML/M2
ECHO LV ESV INDEX A4C: 20 ML/M2
ECHO LV ESV INDEX BP: 20 ML/M2
ECHO LV FRACTIONAL SHORTENING: 33 % (ref 28–44)
ECHO LV INTERNAL DIMENSION DIASTOLE INDEX: 2.03 CM/M2
ECHO LV INTERNAL DIMENSION DIASTOLIC: 3.9 CM (ref 3.9–5.3)
ECHO LV INTERNAL DIMENSION SYSTOLIC INDEX: 1.35 CM/M2
ECHO LV INTERNAL DIMENSION SYSTOLIC: 2.6 CM
ECHO LV IVSD: 1.1 CM (ref 0.6–0.9)
ECHO LV MASS 2D: 140.1 G (ref 67–162)
ECHO LV MASS INDEX 2D: 73 G/M2 (ref 43–95)
ECHO LV POSTERIOR WALL DIASTOLIC: 1.1 CM (ref 0.6–0.9)
ECHO LV RELATIVE WALL THICKNESS RATIO: 0.56
ECHO LVOT AREA: 3.1 CM2
ECHO LVOT AV VTI INDEX: 0.89
ECHO LVOT DIAM: 2 CM
ECHO LVOT MEAN GRADIENT: 2 MMHG
ECHO LVOT PEAK GRADIENT: 5 MMHG
ECHO LVOT PEAK VELOCITY: 1.2 M/S
ECHO LVOT STROKE VOLUME INDEX: 39.1 ML/M2
ECHO LVOT SV: 75 ML
ECHO LVOT VTI: 23.9 CM
ECHO MV A VELOCITY: 0.65 M/S
ECHO MV AREA PHT: 3 CM2
ECHO MV E DECELERATION TIME (DT): 252.4 MS
ECHO MV E VELOCITY: 0.79 M/S
ECHO MV E/A RATIO: 1.22
ECHO MV E/E' LATERAL: 9.88
ECHO MV E/E' RATIO (AVERAGED): 9.88
ECHO MV E/E' SEPTAL: 9.88
ECHO MV PRESSURE HALF TIME (PHT): 73.2 MS
ECHO RIGHT VENTRICULAR SYSTOLIC PRESSURE (RVSP): 26 MMHG
ECHO RV INTERNAL DIMENSION: 3.4 CM
ECHO RV TAPSE: 2.2 CM (ref 1.5–2)
ECHO TV REGURGITANT MAX VELOCITY: 2.38 M/S
ECHO TV REGURGITANT PEAK GRADIENT: 23 MMHG

## 2022-01-28 LAB
CREATININE, EXTERNAL: 0.93
HBA1C MFR BLD HPLC: 5.7 %
LDL-C, EXTERNAL: 93

## 2022-02-10 ENCOUNTER — OFFICE VISIT (OUTPATIENT)
Dept: CARDIOLOGY CLINIC | Age: 67
End: 2022-02-10
Payer: COMMERCIAL

## 2022-02-10 VITALS
BODY MASS INDEX: 35.88 KG/M2 | HEART RATE: 62 BPM | WEIGHT: 195 LBS | OXYGEN SATURATION: 99 % | HEIGHT: 62 IN | SYSTOLIC BLOOD PRESSURE: 148 MMHG | DIASTOLIC BLOOD PRESSURE: 82 MMHG

## 2022-02-10 DIAGNOSIS — R07.89 CHEST DISCOMFORT: ICD-10-CM

## 2022-02-10 DIAGNOSIS — I10 ESSENTIAL HYPERTENSION: Primary | ICD-10-CM

## 2022-02-10 PROCEDURE — 99214 OFFICE O/P EST MOD 30 MIN: CPT | Performed by: SPECIALIST

## 2022-02-10 RX ORDER — AMLODIPINE BESYLATE 2.5 MG/1
2.5 TABLET ORAL DAILY
Qty: 30 TABLET | Refills: 5 | Status: SHIPPED | OUTPATIENT
Start: 2022-02-10 | End: 2022-07-21

## 2022-02-10 NOTE — PROGRESS NOTES
LAST OFFICE VISIT : 8/20/2015      No diagnosis found. Willa Gunderson is a 61 y.o. female with hypertension and hyperlipidemia referred for cardiac clearance. Cardiac risk factors: dyslipidemia, obesity, hypertension, post-menopausal.  I have personally obtained the history from the patient. HISTORY OF PRESENTING ILLNESS      She states she is doing well overall. In the past she was describing a funny feeling in her chest and sharp pins but cardiac work-up was negative.      ACTIVE PROBLEM LIST     Patient Active Problem List    Diagnosis Date Noted    Coronary-myocardial bridge 09/24/2017    Weight gain, abnormal 08/08/2017    Abscess of right hip 11/08/2016    COPD (chronic obstructive pulmonary disease) ([facility] [address] 01/19/2016    HAWK (obstructive sleep apnea) 01/19/2016    Morbid obesity ([facility] [address] 05/19/2015    Insomnia 03/11/2015    Essential tremor 09/30/2014    Anxiety disorder 09/09/2013    Major depressive disorder, recurrent episode, severe ([facility] [address] 09/09/2013           PAST MEDICAL HISTORY     Past Medical History:   Diagnosis Date    Anxiety     Arthritis     osteoarthritis    Asthma     Chronic pain     bilateral knees, lower back, neck    COPD (chronic obstructive pulmonary disease) ([facility] [address]     Coronary-myocardial bridge 9/24/2017    Depression     Diabetes (HCC)     diet controlled    Fatigue     FH: mental illness     GERD (gastroesophageal reflux disease)     Hypertension     Ill-defined condition     essential tremor    Morbid obesity (HCC)     Muscle pain     Ringing in ears     Shingles 2/2016    Sleep apnea     Sleep apnea     no CPAP  2 liters 02 via nc hs    Snoring     SOB (shortness of breath)            PAST SURGICAL HISTORY     Past Surgical History:   Procedure Laterality Date    HX GASTRECTOMY  7/13/16    sleeve by Dr. Bina Long  7/13/16    Hiatal by     HX HYSTERECTOMY  1990    HX ORTHOPAEDIC  2009    R total hip repleacement    HX ORTHOPAEDIC  5/2015    repair of left knee    HX ORTHOPAEDIC  04/17/2017    revision of rt hip surgery          ALLERGIES     Allergies   Allergen Reactions    Penicillin G Benzathin,Procain Hives          FAMILY HISTORY     Family History   Problem Relation Age of Onset    HIV/AIDS Mother     Asthma Father     Asthma Brother     Diabetes Brother     Asthma Brother     Cancer Paternal Grandmother     Anesth Problems Neg Hx     negative for cardiac disease       SOCIAL HISTORY     Social History     Socioeconomic History    Marital status:     Number of children: 3   Occupational History    Occupation: unemployed    Tobacco Use    Smoking status: Never Smoker    Smokeless tobacco: Never Used   Substance and Sexual Activity    Alcohol use: No    Drug use: No   Social History Narrative    In the home with spouse and adult son (both independent)         MEDICATIONS     Current Outpatient Medications   Medication Sig    simvastatin (ZOCOR) 10 mg tablet Take  by mouth nightly.  fluticasone propionate (FLONASE NA) by Nasal route.  fish oil-omega-3 fatty acids (Fish OiL) 340-1,000 mg capsule Take 1 Capsule by mouth daily.  traZODone (DESYREL) 100 mg tablet TAKE 1 TABLET BY MOUTH AT BEDTIME AS NEEDED FOR INSOMNIA  Indications: insomnia associated with depression, insomnia    CALCIUM PO Take 500 mg by mouth daily. 1200MG DAILY    CYANOCOBALAMIN, VITAMIN B-12, (VITAMIN B-12 PO) Take  by mouth daily.  metoprolol succinate (TOPROL-XL) 50 mg XL tablet Take 1 Tab by mouth daily. (Patient taking differently: Take 50 mg by mouth nightly.)    ipratropium-albuterol (COMBIVENT RESPIMAT)  mcg/actuation inhaler Take 1 puff by inhalation as needed for Wheezing.  montelukast (SINGULAIR) 10 mg tablet Take 10 mg by mouth nightly.  MULTIVITAMINS WITH FLUORIDE (MULTI-VITAMIN PO) Take 1 Tab by mouth daily.  (Patient not taking: Reported on 2/10/2022)     No current facility-administered medications for this visit. I have reviewed the nurses notes, vitals, problem list, allergy list, medical history, family, social history and medications. REVIEW OF SYMPTOMS   Pertinent positive per HPI  General: Pt denies excessive weight gain or loss. Pt is able to conduct ADL's  HEENT: Denies blurred vision, headaches, hearing loss, epistaxis and difficulty swallowing. Respiratory: Denies cough, congestion, shortness of breath, GRANADOS, wheezing or stridor. Cardiovascular: Denies precordial pain, palpitations, edema or PND  Gastrointestinal: Denies poor appetite, indigestion, abdominal pain or blood in stool  Genitourinary: Denies hematuria, dysuria, increased urinary frequency  Musculoskeletal: Denies joint pain or swelling from muscles or joints  Neurologic: Denies tremor, paresthesias, headache, or sensory motor disturbance  Psychiatric: Denies confusion, insomnia, depression  Integumentray: Denies rash, itching or ulcers. Hematologic: Denies easy bruising, bleeding     PHYSICAL EXAMINATION      Vitals:    02/10/22 1522   BP: (!) 148/82   Pulse: 62   SpO2: 99%   Weight: 195 lb (88.5 kg)   Height: 5' 2\" (1.575 m)     General: Well developed, in no acute distress. In wheelchair  HEENT: No jaundice, oral mucosa moist, no oral ulcers  Neck: Supple, no stiffness, no lymphadenopathy, supple  Heart:  Normal S1/S2 negative S3 or S4. Regular, no murmur, gallop or rub, no jugular venous distention  Respiratory: Clear bilaterally x 4, no wheezing or rales  Extremities:  No edema, normal cap refill, no cyanosis. Musculoskeletal: No clubbing, no deformities   Neuro: A&Ox3, speech clear, gait stable, cooperative, no focal neurologic deficits  Skin: Skin color is normal. No rashes or lesions. Non diaphoretic, moist.  Vascular: 2+ pulses symmetric in all extremities        EKG: Normal sinus rhythm nonspecific ST-T changes     DIAGNOSTIC DATA     1.  Cardiac Cath  9/23/15-   1) LM long,large no dz  2) LAD moderate caliber course to the apex. There is some myocardial  bridging in the mid segment of the LAD. No disease. Diagonal without disease. DI high take off and no disease  3) Circumflex -mod -> small no disease  4) RCA is large dominant with no disease and the PDA and PL are free of  Disease    2. Echo  7/1/15- EF 65%    3. Lexiscan  8/21/15- mod size, mod-severe grade, predominantly reversible defect involving the apex and distal inferior wall, mod extent of ischemia       LABORATORY DATA            Lab Results   Component Value Date/Time    WBC 16.7 (H) 09/24/2018 06:34 PM    HGB 15.0 09/24/2018 06:34 PM    HCT 45.8 09/24/2018 06:34 PM    PLATELET 024 70/31/0796 06:34 PM    MCV 99.3 (H) 09/24/2018 06:34 PM      Lab Results   Component Value Date/Time    Sodium 139 09/24/2018 06:34 PM    Potassium 3.7 09/24/2018 06:34 PM    Chloride 104 09/24/2018 06:34 PM    CO2 28 09/24/2018 06:34 PM    Anion gap 7 09/24/2018 06:34 PM    Glucose 137 (H) 09/24/2018 06:34 PM    BUN 20 09/24/2018 06:34 PM    Creatinine 1.02 09/24/2018 06:34 PM    BUN/Creatinine ratio 20 09/24/2018 06:34 PM    GFR est AA >60 09/24/2018 06:34 PM    GFR est non-AA 55 (L) 09/24/2018 06:34 PM    Calcium 8.7 09/24/2018 06:34 PM    Bilirubin, total 0.8 09/24/2018 06:34 PM    Alk. phosphatase 122 (H) 09/24/2018 06:34 PM    Protein, total 8.0 09/24/2018 06:34 PM    Albumin 4.0 09/24/2018 06:34 PM    Globulin 4.0 09/24/2018 06:34 PM    A-G Ratio 1.0 (L) 09/24/2018 06:34 PM    ALT (SGPT) 30 09/24/2018 06:34 PM           ASSESSMENT/RECOMMENDATIONS:.         1. Hypertension  -BP samson elevated and I looked through all of her blood pressure recordings on the vital sign flowsheet and trends on the high side  -I think it would be reasonable to add Norvasc 2.5 mg to her regimen and have her follow-up in approximately 8 weeks for blood pressure check.   Encourage her to reduce her sodium intake-  -also asked that she take her blood pressure on a regular basis and ensure that is coming down.     3. dyslipidemia  -LDL is at goal at less than 100    4. Had HAWK in the past and repeated and was ok. -Unclear if she obtain another sleep evaluation    5. Chest discomfort  -Her Lexiscan showed no ischemia done on 12/22/2021, and her echocardiogram demonstrated normal EF at 63%. Her last cholesterol LDL was 93 at goal.      4. Return in 8 weeks for BP check    No orders of the defined types were placed in this encounter. Follow-up and Dispositions  ·   Return in about 6 months (around 8/10/2022). I have discussed the diagnosis with  Manuel Nichols and the intended plan as seen in the above orders. Questions were answered concerning future plans. I have discussed medication side effects and warnings with the patient as well. Thank you,  Megan Catherine MD for involving me in the care of  Manuel Nichols. Please do not hesitate to contact me for further questions/concerns. This note was written by Franz Brittle, scribekick, as dictated by Turner Prescott MD.      Jerald Zhong. MD [place], [address]      [facility], [address] [place] [address] [address]      [phone] / [phone] Fax

## 2022-02-10 NOTE — LETTER
2/10/2022    Patient: Mihaela Richardson   YOB: 1955   Date of Visit: 2/10/2022     Gunner Valdez MD  [place] [phone]  Via Fax: [phone]    Dear Gunner Valdez MD,      Thank you for referring Ms. Julisa Saldana to [facility] for evaluation. My notes for this consultation are attached. If you have questions, please do not hesitate to call me. I look forward to following your patient along with you.       Sincerely,    Ibis Vargas MD

## 2022-02-10 NOTE — PATIENT INSTRUCTIONS
1) all your cardiac testing was good echo was normal and stress test indicated no significant luck is good if you    2) your blood pressure is slightly elevated so I would like for you to start amlodipine. Amlodipine is also known as Norvasc. You take 2.5 mg a day and you can take this in the morning and take your metoprolol at night    3) I will see you for either a virtual visit or office visit to check your blood pressure in 6 to 8 weeks.

## 2022-02-10 NOTE — PROGRESS NOTES
Juvenal Vieyra is a 61 y.o. female    Visit Vitals  BP (!) 148/82 (BP 1 Location: Left upper arm, BP Patient Position: Sitting, BP Cuff Size: Adult)   Pulse 62   Ht 5' 2\" (1.575 m)   Wt 195 lb (88.5 kg)   SpO2 99%   BMI 35.67 kg/m²       Chief Complaint   Patient presents with    Hypertension    Cholesterol Problem       Chest pain NO  SOB NO  Dizziness NO  Swelling NO  Recent hospital visit NO  Refills NO  COVID VACCINE STATUS YES  HAD COVID?  NO

## 2022-03-19 PROBLEM — R63.5 WEIGHT GAIN, ABNORMAL: Status: ACTIVE | Noted: 2017-08-08

## 2022-03-19 PROBLEM — Q24.5 CORONARY-MYOCARDIAL BRIDGE: Status: ACTIVE | Noted: 2017-09-24

## 2022-04-13 ENCOUNTER — TELEPHONE (OUTPATIENT)
Dept: CARDIOLOGY CLINIC | Age: 67
End: 2022-04-13

## 2022-04-13 NOTE — TELEPHONE ENCOUNTER
Patient is requesting a cardiac clearance for a procedure on [id number]        Dr. Arnold Founds  [phone] office  [phone] fax          EDAVX:[phone]

## 2022-05-02 ENCOUNTER — VIRTUAL VISIT (OUTPATIENT)
Dept: CARDIOLOGY CLINIC | Age: 67
End: 2022-05-02
Payer: COMMERCIAL

## 2022-05-02 DIAGNOSIS — I10 ESSENTIAL HYPERTENSION: Primary | ICD-10-CM

## 2022-05-02 DIAGNOSIS — R07.89 CHEST DISCOMFORT: ICD-10-CM

## 2022-05-02 PROCEDURE — 99214 OFFICE O/P EST MOD 30 MIN: CPT | Performed by: SPECIALIST

## 2022-05-02 RX ORDER — SIMVASTATIN 20 MG/1
TABLET, FILM COATED ORAL
COMMUNITY

## 2022-05-02 RX ORDER — METOPROLOL SUCCINATE 100 MG/1
TABLET, EXTENDED RELEASE ORAL DAILY
COMMUNITY

## 2022-05-02 NOTE — PROGRESS NOTES
VIRTUAL VISIT DOCUMENTATION     Pursuant to the emergency declaration under the [facility], Psychiatric hospital waiver authority and the [employer] Act, this Virtual  Visit was conducted, with patient's consent, to reduce the patient's risk of exposure to COVID-19 and provide continuity of care for an established patient. Services were provided through a video synchronous discussion virtually to substitute for in-person clinic visit. CHIEF COMPLAINT      Mariposa Lee is a 61 y.o. female who was seen by synchronous (real-time) audio-video technology on 5/2/2022. Patient is being seen today for pressure check and cardiac preoperative risk ratification for noncardiac surgery. ASSESSMENT        ICD-10-CM ICD-9-CM    1. Essential hypertension  I10 401.9    2. Chest discomfort  R07.89 786.59         PLAN   1. Hypertension  -Blood pressures have been in the 130s over 70s at home. Continue her current medical regimen.     2. dyslipidemia  -LDL is at goal at less than 100    3. Had HAWK in the past and repeated and was ok. -Unclear if she obtain another sleep evaluation    4. Chest discomfort  -Her Lexiscan showed no ischemia done on 12/22/2021, and her echocardiogram demonstrated normal EF at 63%. Her last cholesterol LDL was 93 at goal.     5.  Cardiac preoperative risk stratification for noncardiac surgery  -Based on objective and subjective criteria she is low operative risk from a cardiac standpoint and may proceed with surgery. No further cardiac testing is indicated. All her cardiac testing was done in December with a normal EF and no reversibility on her stress test.     4. Return in 6 months    We discussed the expected course, resolution and complications of the diagnosis(es) in detail. Medication risks, benefits, costs, interactions, and alternatives were discussed as indicated.   I advised her to contact the office if her condition worsens, changes or fails to improve as anticipated. She expressed understanding with the diagnosis(es) and plan    HISTORY OF PRESENTING ILLNESS      Mariposa Lee is a 61 y.o. female with hypertension and hyperlipidemia referred for cardiac clearance. Cardiac risk factors: dyslipidemia, obesity, hypertension, post-menopausal.  I have personally obtained the history from the patient. She states she is doing well overall. In the past she was describing a funny feeling in her chest and sharp pins but cardiac work-up was negative.            ACTIVE PROBLEM LIST     Patient Active Problem List    Diagnosis Date Noted    Coronary-myocardial bridge 09/24/2017    Weight gain, abnormal 08/08/2017    Abscess of right hip 11/08/2016    COPD (chronic obstructive pulmonary disease) ([facility] 01/19/2016    HAWK (obstructive sleep apnea) 01/19/2016    Morbid obesity ([address] 05/19/2015    Insomnia 03/11/2015    Essential tremor 09/30/2014    Anxiety disorder 09/09/2013    Major depressive disorder, recurrent episode, severe ([address] 09/09/2013           PAST MEDICAL HISTORY     Past Medical History:   Diagnosis Date    Anxiety     Arthritis     osteoarthritis    Asthma     Chronic pain     bilateral knees, lower back, neck    COPD (chronic obstructive pulmonary disease) ([address]     Coronary-myocardial bridge 9/24/2017    Depression     Diabetes (HCC)     diet controlled    Fatigue     FH: mental illness     GERD (gastroesophageal reflux disease)     Hypertension     Ill-defined condition     essential tremor    Morbid obesity (HCC)     Muscle pain     Ringing in ears     Shingles 2/2016    Sleep apnea     Sleep apnea     no CPAP  2 liters 02 via nc hs    Snoring     SOB (shortness of breath)            PAST SURGICAL HISTORY     Past Surgical History:   Procedure Laterality Date    HX GASTRECTOMY  7/13/16    sleeve by Dr. Milena Carrizales  7/13/16    Hiatal by     HX HYSTERECTOMY  1990    HX ORTHOPAEDIC  2009    R total hip repleacement    HX ORTHOPAEDIC  5/2015    repair of left knee    HX ORTHOPAEDIC  04/17/2017    revision of rt hip surgery          ALLERGIES     Allergies   Allergen Reactions    Penicillin G Benzathin,Procain Hives          FAMILY HISTORY     Family History   Problem Relation Age of Onset    HIV/AIDS Mother     Asthma Father     Asthma Brother     Diabetes Brother     Asthma Brother     Cancer Paternal Grandmother     Anesth Problems Neg Hx     negative for cardiac disease       SOCIAL HISTORY     Social History     Socioeconomic History    Marital status:     Number of children: 3   Occupational History    Occupation: unemployed    Tobacco Use    Smoking status: Never Smoker    Smokeless tobacco: Never Used   Substance and Sexual Activity    Alcohol use: No    Drug use: No   Social History Narrative    In the home with spouse and adult son (both independent)         MEDICATIONS     Current Outpatient Medications   Medication Sig    metoprolol succinate (TOPROL-XL) 100 mg tablet Take  by mouth daily.  simvastatin (ZOCOR) 20 mg tablet Take  by mouth nightly.  amLODIPine (NORVASC) 2.5 mg tablet Take 1 Tablet by mouth daily.  fluticasone propionate (FLONASE NA) by Nasal route.  fish oil-omega-3 fatty acids (Fish OiL) 340-1,000 mg capsule Take 1 Capsule by mouth daily.  traZODone (DESYREL) 100 mg tablet TAKE 1 TABLET BY MOUTH AT BEDTIME AS NEEDED FOR INSOMNIA  Indications: insomnia associated with depression, insomnia    CALCIUM PO Take 500 mg by mouth daily. 1200MG DAILY    CYANOCOBALAMIN, VITAMIN B-12, (VITAMIN B-12 PO) Take  by mouth daily.  ipratropium-albuterol (COMBIVENT RESPIMAT)  mcg/actuation inhaler Take 1 puff by inhalation as needed for Wheezing.  montelukast (SINGULAIR) 10 mg tablet Take 10 mg by mouth nightly. No current facility-administered medications for this visit. I have reviewed the nurses notes, vitals, problem list, allergy list, medical history, family, social history and medications. REVIEW OF SYMPTOMS     Constitutional: Negative for fever, chills, malaise/fatigue and diaphoresis. Respiratory: Negative for cough, hemoptysis, sputum production, shortness of breath and wheezing. Cardiovascular: Negative for chest pain, palpitations, orthopnea, claudication, leg swelling and PND. Gastrointestinal: Negative for heartburn, nausea, vomiting, blood in stool and melena. Genitourinary: Negative for dysuria and flank pain. Musculoskeletal: Negative for joint pain and back pain. Skin: Negative for rash. Neurological: Negative for focal weakness, seizures, loss of consciousness, weakness and headaches. Endo/Heme/Allergies: Negative for abnormal bleeding. Psychiatric/Behavioral: Negative for memory loss. PHYSICAL EXAMINATION      Due to this being a TeleHealth evaluation, many elements of the physical examination are unable to be assessed. General: Well developed, in no acute distress, cooperative and alert  HEENT: Pupils equal/round. No marked JVD visible on video. Respiratory: No audible wheezing, no signs of respiratory distress, lips non cyanotic  Extremities:  No edema  Neuro: A&Ox3, speech clear, no facial droop, answering questions appropriately  Skin: Skin color is normal. No rashes or lesions. Non diaphoretic on visible skin during exam       DIAGNOSTIC DATA      1. Cardiac Cath  9/23/15-   1) LM long,large no dz  2) LAD moderate caliber course to the apex. There is some myocardial  bridging in the mid segment of the LAD. No disease. Diagonal without disease. DI high take off and no disease  3) Circumflex -mod -> small no disease  4) RCA is large dominant with no disease and the PDA and PL are free of  Disease    2. Echo  7/1/15- EF 65%  12/22/21- EF 63%    3.  Lexiscan  8/21/15- mod size, mod-severe grade, predominantly reversible defect involving the apex and distal inferior wall, mod extent of ischemia  12/22/21- no ischemia, EF 58%    4. Lipids  4/5/17- , HDL 66, LDL 99, TG 88  8/10/21- , HDL 65, ,   1/28/22- , HDL 63, LDL 93,        LABORATORY DATA      Lab Results   Component Value Date/Time    WBC 16.7 (H) 09/24/2018 06:34 PM    HGB 15.0 09/24/2018 06:34 PM    HCT 45.8 09/24/2018 06:34 PM    PLATELET 170 22/76/2491 06:34 PM    MCV 99.3 (H) 09/24/2018 06:34 PM      Lab Results   Component Value Date/Time    Sodium 139 09/24/2018 06:34 PM    Potassium 3.7 09/24/2018 06:34 PM    Chloride 104 09/24/2018 06:34 PM    CO2 28 09/24/2018 06:34 PM    Anion gap 7 09/24/2018 06:34 PM    Glucose 137 (H) 09/24/2018 06:34 PM    BUN 20 09/24/2018 06:34 PM    Creatinine 1.02 09/24/2018 06:34 PM    BUN/Creatinine ratio 20 09/24/2018 06:34 PM    GFR est AA >60 09/24/2018 06:34 PM    GFR est non-AA 55 (L) 09/24/2018 06:34 PM    Calcium 8.7 09/24/2018 06:34 PM    Bilirubin, total 0.8 09/24/2018 06:34 PM    Alk. phosphatase 122 (H) 09/24/2018 06:34 PM    Protein, total 8.0 09/24/2018 06:34 PM    Albumin 4.0 09/24/2018 06:34 PM    Globulin 4.0 09/24/2018 06:34 PM    A-G Ratio 1.0 (L) 09/24/2018 06:34 PM    ALT (SGPT) 30 09/24/2018 06:34 PM             FOLLOW-UP     Follow-up and Dispositions    · Return in about 6 months (around 11/2/2022). Patient was made aware and verbalized understanding that an appointment will be scheduled for them for a virtual visit and/or office visit within the above time frame. Patient understanding his/her responsibility to call and change time/date if he/she so chooses. Thank you, Tawanda Reeder MD for allowing me to participate in the care of Shirlyn Rinne. Please do not hesitate to contact me for further questions/concerns. Greater than 20 minutes was spent in direct video patient care, planning and chart review. This visit was conducted using [employer] telemedicine services. Cely Smith MD    [address], [address] [place] [address]  [address]        [phone] / [phone] Fax       [facility], [address] [address]  [address]  [phone] / [phone] Fax

## 2022-05-03 ENCOUNTER — TELEPHONE (OUTPATIENT)
Dept: CARDIOLOGY CLINIC | Age: 67
End: 2022-05-03

## 2022-05-03 NOTE — TELEPHONE ENCOUNTER
MD Iram Palencia, BRADY  Please send copy of my note as well as her last EKG to her orthopedic surgeons office

## 2022-07-21 RX ORDER — AMLODIPINE BESYLATE 2.5 MG/1
2.5 TABLET ORAL DAILY
Qty: 30 TABLET | Refills: 5 | Status: SHIPPED | OUTPATIENT
Start: 2022-07-21

## 2023-05-11 ENCOUNTER — HOSPITAL ENCOUNTER (EMERGENCY)
Facility: HOSPITAL | Age: 68
Discharge: HOME | End: 2023-05-12
Attending: EMERGENCY MEDICINE
Payer: MEDICARE

## 2023-05-11 DIAGNOSIS — T78.40XA ALLERGIC REACTION TO DRUG, INITIAL ENCOUNTER: Primary | ICD-10-CM

## 2023-05-11 PROCEDURE — 99283 EMERGENCY DEPT VISIT LOW MDM: CPT

## 2023-05-12 VITALS
DIASTOLIC BLOOD PRESSURE: 78 MMHG | OXYGEN SATURATION: 97 % | RESPIRATION RATE: 18 BRPM | SYSTOLIC BLOOD PRESSURE: 159 MMHG | HEART RATE: 55 BPM | WEIGHT: 209.88 LBS | HEIGHT: 62 IN | BODY MASS INDEX: 38.62 KG/M2 | TEMPERATURE: 97.9 F

## 2023-05-12 RX ORDER — DIPHENHYDRAMINE HCL 25 MG
25 CAPSULE ORAL EVERY 6 HOURS PRN
Qty: 30 CAPSULE | Refills: 0 | Status: SHIPPED | OUTPATIENT
Start: 2023-05-12 | End: 2023-05-22

## 2023-05-12 RX ORDER — EPINEPHRINE 0.3 MG/.3ML
0.3 INJECTION SUBCUTANEOUS
Qty: 3 EACH | Refills: 1 | Status: SHIPPED | OUTPATIENT
Start: 2023-05-12 | End: 2023-05-12

## 2023-05-12 RX ORDER — FAMOTIDINE 40 MG/1
40 TABLET, FILM COATED ORAL DAILY
Qty: 30 TABLET | Refills: 0 | Status: SHIPPED | OUTPATIENT
Start: 2023-05-12

## 2023-05-12 RX ORDER — AZITHROMYCIN 500 MG/1
500 TABLET, FILM COATED ORAL DAILY
Qty: 3 TABLET | Refills: 0 | Status: SHIPPED | OUTPATIENT
Start: 2023-05-12 | End: 2023-05-15

## 2023-05-12 RX ORDER — PREDNISONE 20 MG/1
60 TABLET ORAL DAILY
Qty: 15 TABLET | Refills: 0 | Status: SHIPPED | OUTPATIENT
Start: 2023-05-12 | End: 2023-05-17

## 2023-05-12 ASSESSMENT — PAIN - FUNCTIONAL ASSESSMENT: PAIN_FUNCTIONAL_ASSESSMENT: NONE - DENIES PAIN

## 2023-05-12 NOTE — ED NOTES
Amie Orr has reviewed discharge instructions with the patient. Opportunity for questions and clarification was provided. The patient verbalized understanding. Patient discharged out of the ED via ambulation with no difficulty and in stable condition.        Jacob Zayas RN  05/12/23 [id number]

## 2023-05-12 NOTE — ED TRIAGE NOTES
Pt presents to ER with her  and c/o having an allergic reaction to bactrim. States she felt hot and itchy all over her body with redness to her face and neck area. Denies having respiratory issues. States she took 2 capsules of Benadryl as recommended to her by her physician. Placed on Bactrim for sinus infection.

## 2023-05-12 NOTE — DISCHARGE INSTRUCTIONS
Stop taking Bactrim. Start taking azithromycin for your sinus infection. Use Benadryl as needed for any itching or facial rash. Take Pepcid as well for any itching or rash. Start taking the prednisone to help with your allergic reaction. If you develop any severe allergic reaction symptoms and shortness of breath use the epinephrine autoinjector and call 911.   Follow-up with your doctor and return for any new or worsening symptoms

## 2023-05-20 RX ORDER — MONTELUKAST SODIUM 10 MG/1
10 TABLET ORAL NIGHTLY
COMMUNITY
Start: 2014-07-15

## 2023-05-20 RX ORDER — AMLODIPINE BESYLATE 2.5 MG/1
2.5 TABLET ORAL DAILY
COMMUNITY
Start: 2022-07-21

## 2023-05-20 RX ORDER — SIMVASTATIN 20 MG
TABLET ORAL
COMMUNITY

## 2023-05-20 RX ORDER — TRAZODONE HYDROCHLORIDE 100 MG/1
TABLET ORAL
COMMUNITY
Start: 2017-11-30

## 2023-05-20 RX ORDER — METOPROLOL SUCCINATE 100 MG/1
TABLET, EXTENDED RELEASE ORAL DAILY
COMMUNITY

## 2023-05-26 NOTE — ED PROVIDER NOTES
[facility] EMERGENCY DEPT  EMERGENCY DEPARTMENT ENCOUNTER      CHIEF COMPLAINT       Chief Complaint   Patient presents with    Allergic Reaction     Bactrim         HISTORY OF PRESENT ILLNESS     Pt presents to ER with her  and c/o having an allergic reaction to bactrim. States she felt hot and itchy all over her body with redness to her face and neck area. Denies having respiratory issues. States she took 2 capsules of Benadryl as recommended to her by her physician. Placed on Bactrim for sinus infection.     PAST MEDICAL HISTORY     Past Medical History:   Diagnosis Date    Anxiety     Arthritis     osteoarthritis    Asthma     Chronic pain     bilateral knees, lower back, neck    COPD (chronic obstructive pulmonary disease) ([facility] [facility]     Coronary-myocardial bridge 9/24/2017    Depression     Diabetes (HCC)     diet controlled    Fatigue     FH: mental illness     GERD (gastroesophageal reflux disease)     Hypertension     Ill-defined condition     essential tremor    Morbid obesity (HCC)     Muscle pain     Ringing in ears     Shingles 2/2016    Sleep apnea     no CPAP  2 liters 02 via nc hs    Sleep apnea     Snoring     SOB (shortness of breath)        SURGICAL HISTORY       Past Surgical History:   Procedure Laterality Date    GASTRECTOMY  7/13/16    sleeve by Dr. Tadeo Dasilva  7/13/16    Hiatal by [employer]  5/2015    repair of left knee    ORTHOPEDIC SURGERY  2009    R total hip repleacement    ORTHOPEDIC SURGERY  04/17/2017    revision of rt hip surgery       CURRENT MEDICATIONS       Discharge Medication List as of 5/12/2023 12:30 AM          ALLERGIES     Bactrim [sulfamethoxazole-trimethoprim] and Pcn [penicillins]    FAMILY HISTORY       Family History   Problem Relation Age of Onset    Cancer Paternal Grandmother     Anesth Problems Neg Hx     Asthma Brother     Diabetes Brother     Asthma Brother     Asthma Father     HIV/AIDS Mother

## 2023-11-22 ENCOUNTER — HOSPITAL ENCOUNTER (OUTPATIENT)
Facility: HOSPITAL | Age: 68
Discharge: HOME | End: 2023-11-25
Payer: MEDICARE

## 2023-11-22 VITALS — OXYGEN SATURATION: 95 % | RESPIRATION RATE: 16 BRPM | HEART RATE: 60 BPM

## 2023-11-22 DIAGNOSIS — J98.4 RESTRICTIVE LUNG DISEASE: ICD-10-CM

## 2023-11-22 DIAGNOSIS — J45.909 ASTHMA, UNSPECIFIED ASTHMA SEVERITY, UNSPECIFIED WHETHER COMPLICATED, UNSPECIFIED WHETHER PERSISTENT: ICD-10-CM

## 2023-11-22 PROCEDURE — 94729 DIFFUSING CAPACITY: CPT

## 2023-11-22 PROCEDURE — 94640 AIRWAY INHALATION TREATMENT: CPT

## 2023-11-22 PROCEDURE — 94726 PLETHYSMOGRAPHY LUNG VOLUMES: CPT

## 2023-11-22 PROCEDURE — 6370000000 HC RX 637 (ALT 250 FOR IP): Performed by: INTERNAL MEDICINE

## 2023-11-22 PROCEDURE — 94060 EVALUATION OF WHEEZING: CPT

## 2023-11-22 RX ORDER — ALBUTEROL SULFATE 90 UG/1
4 AEROSOL, METERED RESPIRATORY (INHALATION) ONCE
Status: COMPLETED | OUTPATIENT
Start: 2023-11-22 | End: 2023-11-22

## 2023-11-22 RX ADMIN — ALBUTEROL SULFATE 4 PUFF: 108 INHALANT RESPIRATORY (INHALATION) at 12:01

## 2023-11-23 NOTE — PROCEDURES
[address] [place] [address]  PULMONARY FUNCTION TEST    Name:  Rosalinda Negrete  MR#:  [id number]  :  1955  ACCOUNT #:  [de-identified]  DATE OF SERVICE:  2023      Spirometry reveals an FEV1 to FVC ratio of 58% predicted. This was consistent with an obstructive defect with an FEV1 of 1.1 L and 59% predicted. This obstruction is considered moderately severe but it significantly improved with administration of short-acting bronchodilators. FVC was within normal limits at 1.9 L and 80% predicted and it also have significantly improved with administration of short-acting bronchodilators. Lung volumes reveal no air trapping with a residual volume of 134% predicted. Total lung capacity is normal at 90% predicted. DLCO is within normal limits at 90% predicted.       Ge Del Rosario MD      [id number]  D:  2023 12:45  T:  2023 18:58  JOB #:  [id number]

## 2024-02-02 ENCOUNTER — HOSPITAL ENCOUNTER (OUTPATIENT)
Facility: HOSPITAL | Age: 69
End: 2024-02-02
Payer: MEDICARE

## 2024-02-02 DIAGNOSIS — M54.9 MID BACK PAIN: ICD-10-CM

## 2024-02-02 DIAGNOSIS — M54.2 CERVICAL SPINE PAIN: ICD-10-CM

## 2024-02-02 DIAGNOSIS — G89.29 CHRONIC BILATERAL LOW BACK PAIN WITHOUT SCIATICA: ICD-10-CM

## 2024-02-02 DIAGNOSIS — M54.50 CHRONIC BILATERAL LOW BACK PAIN WITHOUT SCIATICA: ICD-10-CM

## 2024-02-02 PROCEDURE — 72146 MRI CHEST SPINE W/O DYE: CPT

## 2024-02-02 PROCEDURE — 72148 MRI LUMBAR SPINE W/O DYE: CPT

## 2024-02-02 PROCEDURE — 72141 MRI NECK SPINE W/O DYE: CPT

## 2024-03-08 ENCOUNTER — HOSPITAL ENCOUNTER (EMERGENCY)
Facility: HOSPITAL | Age: 69
Discharge: HOME | End: 2024-03-08
Attending: EMERGENCY MEDICINE
Payer: MEDICARE

## 2024-03-08 ENCOUNTER — APPOINTMENT (OUTPATIENT)
Facility: HOSPITAL | Age: 69
End: 2024-03-08
Payer: MEDICARE

## 2024-03-08 VITALS
RESPIRATION RATE: 18 BRPM | OXYGEN SATURATION: 98 % | HEART RATE: 61 BPM | SYSTOLIC BLOOD PRESSURE: 184 MMHG | WEIGHT: 209 LBS | DIASTOLIC BLOOD PRESSURE: 91 MMHG | HEIGHT: 62 IN | TEMPERATURE: 98.4 F | BODY MASS INDEX: 38.46 KG/M2

## 2024-03-08 DIAGNOSIS — M25.561 ACUTE PAIN OF RIGHT KNEE: Primary | ICD-10-CM

## 2024-03-08 PROCEDURE — 73562 X-RAY EXAM OF KNEE 3: CPT

## 2024-03-08 PROCEDURE — 99283 EMERGENCY DEPT VISIT LOW MDM: CPT

## 2024-03-08 PROCEDURE — 6370000000 HC RX 637 (ALT 250 FOR IP): Performed by: EMERGENCY MEDICINE

## 2024-03-08 RX ORDER — OXYCODONE HYDROCHLORIDE AND ACETAMINOPHEN 5; 325 MG/1; MG/1
1 TABLET ORAL
Status: COMPLETED | OUTPATIENT
Start: 2024-03-08 | End: 2024-03-08

## 2024-03-08 RX ORDER — IBUPROFEN 800 MG/1
800 TABLET ORAL 3 TIMES DAILY PRN
Qty: 90 TABLET | Refills: 0 | Status: SHIPPED | OUTPATIENT
Start: 2024-03-08

## 2024-03-08 RX ADMIN — OXYCODONE AND ACETAMINOPHEN 1 TABLET: 5; 325 TABLET ORAL at 16:53

## 2024-03-08 ASSESSMENT — LIFESTYLE VARIABLES
HOW MANY STANDARD DRINKS CONTAINING ALCOHOL DO YOU HAVE ON A TYPICAL DAY: PATIENT DOES NOT DRINK
HOW OFTEN DO YOU HAVE A DRINK CONTAINING ALCOHOL: NEVER

## 2024-03-08 ASSESSMENT — ENCOUNTER SYMPTOMS
EYE DISCHARGE: 0
FACIAL SWELLING: 0
EYE PAIN: 0
DIARRHEA: 0
NAUSEA: 0
SORE THROAT: 0
COUGH: 0
BACK PAIN: 0
VOMITING: 0
ABDOMINAL PAIN: 0
CHEST TIGHTNESS: 0
SHORTNESS OF BREATH: 0

## 2024-03-08 ASSESSMENT — PAIN - FUNCTIONAL ASSESSMENT
PAIN_FUNCTIONAL_ASSESSMENT: ACTIVITIES ARE NOT PREVENTED
PAIN_FUNCTIONAL_ASSESSMENT: 0-10
PAIN_FUNCTIONAL_ASSESSMENT: 0-10

## 2024-03-08 ASSESSMENT — PAIN SCALES - GENERAL
PAINLEVEL_OUTOF10: 10
PAINLEVEL_OUTOF10: 5

## 2024-03-08 ASSESSMENT — PAIN DESCRIPTION - DESCRIPTORS: DESCRIPTORS: THROBBING;TENDER

## 2024-03-08 ASSESSMENT — PAIN DESCRIPTION - ORIENTATION: ORIENTATION: POSTERIOR

## 2024-03-08 ASSESSMENT — PAIN DESCRIPTION - LOCATION: LOCATION: KNEE

## 2024-03-08 NOTE — ED NOTES
Pt given discharge instructions, patient education, prescriptions and follow up information. Pt verbalizes understanding. All questions answered. Pt discharged to home in private vehicle, via wheelchair. Pt A&Ox4, RA, pain controlled.    Pt received AVS forms, no further questions or concerns at this time.

## 2024-03-08 NOTE — ED PROVIDER NOTES
Family History   Problem Relation Age of Onset    Cancer Paternal Grandmother     Anesth Problems Neg Hx     Asthma Brother     Diabetes Brother     Asthma Brother     Asthma Father     HIV/AIDS Mother           SOCIAL HISTORY       Social History     Socioeconomic History    Marital status:      Spouse name: None    Number of children: None    Years of education: None    Highest education level: None   Tobacco Use    Smoking status: Never    Smokeless tobacco: Never   Substance and Sexual Activity    Alcohol use: No    Drug use: No   Social History Narrative    In the home with spouse and adult son (both independent)           PHYSICAL EXAM    (up to 7 for level 4, 8 or more for level 5)     ED Triage Vitals   BP Temp Temp Source Pulse Respirations SpO2 Height Weight - Scale   03/08/24 1549 03/08/24 1552 03/08/24 1552 03/08/24 1549 03/08/24 1552 03/08/24 1549 03/08/24 1549 03/08/24 1549   (!) 184/91 98.4 °F (36.9 °C) Oral 61 18 98 % 1.575 m (5' 2\") 94.8 kg (209 lb)       Body mass index is 38.23 kg/m².    Physical Exam  Vitals and nursing note reviewed.   Constitutional:       Appearance: She is normal weight.   HENT:      Head: Normocephalic and atraumatic.   Eyes:      Extraocular Movements: Extraocular movements intact.      Conjunctiva/sclera: Conjunctivae normal.      Pupils: Pupils are equal, round, and reactive to light.   Cardiovascular:      Rate and Rhythm: Normal rate and regular rhythm.      Pulses: Normal pulses.      Heart sounds: Normal heart sounds. No murmur heard.  Pulmonary:      Effort: Pulmonary effort is normal. No respiratory distress.      Breath sounds: No stridor. No wheezing.   Abdominal:      General: Abdomen is flat. Bowel sounds are normal. There is no distension.      Palpations: Abdomen is soft.      Tenderness: There is no abdominal tenderness.   Musculoskeletal:         General: Normal range of motion.      Cervical back: Normal range of motion. No rigidity.    Lymphadenopathy:      Cervical: No cervical adenopathy.   Skin:     General: Skin is warm and dry.   Neurological:      General: No focal deficit present.      Mental Status: She is alert and oriented to person, place, and time. Mental status is at baseline.   Psychiatric:         Mood and Affect: Mood normal.         Behavior: Behavior normal.         DIAGNOSTIC RESULTS     EKG: All EKG's are interpreted by the Emergency Department Physician who either signs or Co-signs this chart in the absence of a cardiologist.        RADIOLOGY:   Non-plain film images such as CT, Ultrasound and MRI are read by the radiologist. Plain radiographic images are visualized and preliminarily interpreted by the emergency physician with the below findings:        Interpretation per the Radiologist below, if available at the time of this note:    XR KNEE RIGHT (3 VIEWS)   Final Result   No acute bony abnormalities.              LABS:  Labs Reviewed - No data to display    All other labs were within normal range or not returned as of this dictation.    EMERGENCY DEPARTMENT COURSE and DIFFERENTIAL DIAGNOSIS/MDM:   Vitals:    Vitals:    03/08/24 1549 03/08/24 1552   BP: (!) 184/91    Pulse: 61    Resp:  18   Temp:  98.4 °F (36.9 °C)   TempSrc:  Oral   SpO2: 98%    Weight: 94.8 kg (209 lb)    Height: 1.575 m (5' 2\")            Medical Decision Making  68-year-old female with posterior pain today will x-ray the right knee and give Percocet for pain.  Differential includes Baker's cyst versus knee sprain    X-ray normal patient is suitable for discharge to home.    Amount and/or Complexity of Data Reviewed  Radiology: ordered.    Risk  Prescription drug management.            REASSESSMENT            CONSULTS:  None    PROCEDURES:  Unless otherwise noted below, none     Procedures      FINAL IMPRESSION      1. Acute pain of right knee          DISPOSITION/PLAN   DISPOSITION Decision To Discharge 03/08/2024 05:21:19 PM      PATIENT REFERRED

## 2024-03-08 NOTE — ED TRIAGE NOTES
Pt states she has pain behind her right knee that started a week ago and has gotten worse to where she is unable to bear weight on that leg.     Pt denies injury or trauma to area.

## 2024-04-16 ENCOUNTER — HOSPITAL ENCOUNTER (OUTPATIENT)
Facility: HOSPITAL | Age: 69
Discharge: HOME | End: 2024-04-19
Payer: MEDICARE

## 2024-04-16 ENCOUNTER — TRANSCRIBE ORDERS (OUTPATIENT)
Facility: HOSPITAL | Age: 69
End: 2024-04-16

## 2024-04-16 DIAGNOSIS — R05.9 COUGH, UNSPECIFIED TYPE: Primary | ICD-10-CM

## 2024-04-16 DIAGNOSIS — R05.9 COUGH, UNSPECIFIED TYPE: ICD-10-CM

## 2024-04-16 PROCEDURE — 71046 X-RAY EXAM CHEST 2 VIEWS: CPT

## 2024-07-30 RX ORDER — AMLODIPINE BESYLATE 2.5 MG/1
2.5 TABLET ORAL DAILY
Qty: 30 TABLET | OUTPATIENT
Start: 2024-07-30

## 2024-07-30 NOTE — TELEPHONE ENCOUNTER
Per verbal order from Dr. Davina Taylor  Last appt: 5/2/2022     No future appointments.    Requested Prescriptions     Refused Prescriptions Disp Refills    amLODIPine (NORVASC) 2.5 MG tablet [Pharmacy Med Name: AMLODIPINE BESYLATE 2.5MG TABLETS] 30 tablet      Sig: TAKE 1 TABLET BY MOUTH DAILY     Refused By: ANNETTE DUBOIS     Reason for Refusal: Patient no longer under prescriber care

## 2025-03-03 ENCOUNTER — TRANSCRIBE ORDERS (OUTPATIENT)
Facility: HOSPITAL | Age: 70
End: 2025-03-03

## 2025-03-03 DIAGNOSIS — R91.8 LUNG MASS: Primary | ICD-10-CM

## 2025-03-07 ENCOUNTER — HOSPITAL ENCOUNTER (OUTPATIENT)
Facility: HOSPITAL | Age: 70
Discharge: HOME | End: 2025-03-10
Payer: MEDICARE

## 2025-03-07 DIAGNOSIS — R91.8 LUNG MASS: ICD-10-CM

## 2025-03-07 PROCEDURE — 71250 CT THORAX DX C-: CPT
